# Patient Record
Sex: MALE | Race: WHITE | Employment: UNEMPLOYED | URBAN - METROPOLITAN AREA
[De-identification: names, ages, dates, MRNs, and addresses within clinical notes are randomized per-mention and may not be internally consistent; named-entity substitution may affect disease eponyms.]

---

## 2017-03-14 ENCOUNTER — OFFICE VISIT (OUTPATIENT)
Dept: CARDIOLOGY | Age: 52
End: 2017-03-14

## 2017-03-14 VITALS
RESPIRATION RATE: 14 BRPM | DIASTOLIC BLOOD PRESSURE: 68 MMHG | WEIGHT: 310.8 LBS | BODY MASS INDEX: 47.1 KG/M2 | SYSTOLIC BLOOD PRESSURE: 124 MMHG | HEIGHT: 68 IN | OXYGEN SATURATION: 92 % | HEART RATE: 96 BPM

## 2017-03-14 DIAGNOSIS — R94.31 ABNORMAL EKG: ICD-10-CM

## 2017-03-14 DIAGNOSIS — E78.2 MIXED HYPERLIPIDEMIA: ICD-10-CM

## 2017-03-14 DIAGNOSIS — I25.10 CORONARY ARTERY DISEASE INVOLVING NATIVE CORONARY ARTERY OF NATIVE HEART WITHOUT ANGINA PECTORIS: ICD-10-CM

## 2017-03-14 DIAGNOSIS — I77.9 CAROTID ARTERY DISEASE, UNSPECIFIED LATERALITY (HCC): ICD-10-CM

## 2017-03-14 DIAGNOSIS — E66.01 MORBID OBESITY DUE TO EXCESS CALORIES (HCC): ICD-10-CM

## 2017-03-14 DIAGNOSIS — I73.9 PAD (PERIPHERAL ARTERY DISEASE) (HCC): ICD-10-CM

## 2017-03-14 DIAGNOSIS — Z72.0 TOBACCO ABUSE: ICD-10-CM

## 2017-03-14 DIAGNOSIS — I73.9 CLAUDICATION, CLASS IV (HCC): Primary | ICD-10-CM

## 2017-03-14 PROCEDURE — 93000 ELECTROCARDIOGRAM COMPLETE: CPT | Performed by: INTERNAL MEDICINE

## 2017-03-14 PROCEDURE — 99213 OFFICE O/P EST LOW 20 MIN: CPT | Performed by: INTERNAL MEDICINE

## 2017-03-14 RX ORDER — FUROSEMIDE 20 MG/1
20 TABLET ORAL DAILY
Qty: 30 TABLET | Refills: 6 | Status: SHIPPED | OUTPATIENT
Start: 2017-03-14 | End: 2017-10-10 | Stop reason: SDUPTHER

## 2017-04-20 RX ORDER — CARVEDILOL 3.12 MG/1
TABLET ORAL
Qty: 60 TABLET | Refills: 5 | Status: SHIPPED | OUTPATIENT
Start: 2017-04-20 | End: 2017-11-07 | Stop reason: SDUPTHER

## 2017-04-20 RX ORDER — AMLODIPINE BESYLATE 5 MG/1
TABLET ORAL
Qty: 30 TABLET | Refills: 5 | Status: SHIPPED | OUTPATIENT
Start: 2017-04-20 | End: 2017-11-07 | Stop reason: SDUPTHER

## 2017-04-20 RX ORDER — CLOPIDOGREL BISULFATE 75 MG/1
TABLET ORAL
Qty: 30 TABLET | Refills: 5 | Status: SHIPPED | OUTPATIENT
Start: 2017-04-20 | End: 2017-10-10 | Stop reason: SDUPTHER

## 2017-09-28 RX ORDER — ATORVASTATIN CALCIUM 20 MG/1
TABLET, FILM COATED ORAL
Qty: 90 TABLET | Refills: 0 | Status: SHIPPED | OUTPATIENT
Start: 2017-09-28 | End: 2018-05-01 | Stop reason: SDUPTHER

## 2017-10-10 ENCOUNTER — OFFICE VISIT (OUTPATIENT)
Dept: CARDIOLOGY | Age: 52
End: 2017-10-10

## 2017-10-10 VITALS
WEIGHT: 298 LBS | HEART RATE: 80 BPM | OXYGEN SATURATION: 97 % | BODY MASS INDEX: 45.16 KG/M2 | SYSTOLIC BLOOD PRESSURE: 136 MMHG | DIASTOLIC BLOOD PRESSURE: 80 MMHG | HEIGHT: 68 IN

## 2017-10-10 DIAGNOSIS — Z72.0 TOBACCO ABUSE: ICD-10-CM

## 2017-10-10 DIAGNOSIS — E78.2 MIXED HYPERLIPIDEMIA: ICD-10-CM

## 2017-10-10 DIAGNOSIS — I87.2 VENOUS INSUFFICIENCY: Primary | ICD-10-CM

## 2017-10-10 DIAGNOSIS — I73.9 PAD (PERIPHERAL ARTERY DISEASE) (HCC): ICD-10-CM

## 2017-10-10 DIAGNOSIS — E66.01 MORBID OBESITY DUE TO EXCESS CALORIES (HCC): ICD-10-CM

## 2017-10-10 PROCEDURE — 99213 OFFICE O/P EST LOW 20 MIN: CPT | Performed by: INTERNAL MEDICINE

## 2017-10-10 RX ORDER — CLOPIDOGREL BISULFATE 75 MG/1
TABLET ORAL
Qty: 30 TABLET | Refills: 6 | Status: SHIPPED | OUTPATIENT
Start: 2017-10-10 | End: 2018-05-01 | Stop reason: SDUPTHER

## 2017-10-10 RX ORDER — FUROSEMIDE 40 MG/1
40 TABLET ORAL DAILY
Qty: 30 TABLET | Refills: 6 | Status: SHIPPED | OUTPATIENT
Start: 2017-10-10 | End: 2018-05-01 | Stop reason: SDUPTHER

## 2017-10-10 NOTE — PROGRESS NOTES
Chief Complaint   Patient presents with    Coronary Artery Disease    Carotid Disease       4-30-15: Patient presents for initial medical evaluation. Patient is followed on a regular basis by no one. Referred by Dr. Stephanie Guerra for abn RALEIGH with right RALEIGH of 0.62, left 0.70 at rest. No exercise was performed. Does have right foot pain and left foot discoloration. No LE ulcers present. Symptoms have been ongoing for the past week or so. No hx of LE procedures. Pain occurs with walking less that 200 feet. Patient with chronic back pain with hx of pain pump. No hx of MI, CHF or arrhythmias. No hx of stress test or LHC. Denies any CP but does have SOB and worse with minimal exertion. Smokes [id number] yrs. 6-2-15: as above, s/p abnormal nuclear stress test with reversible ischemia in inferolateral wall/Cx territory, EF of 50%. S/p ECHO with EF of 50%, mild LVH, grade I DD. Compliant with meds. He is SOB with mild exertion, but denies any CP. Continues to have LE discomfort/pain as well as previously stated. Now recalls that he had a TIA a few years back as well. States he had some carotid artery disease at that time but does not know severity. 7-16-15: as above, s/p LHC with 70-80% mid RCA stenosis, s/p FFR=0.82, moderate disease of OM2, normal LVF. S/p LE angiogram with severe left LIEN stenosis s/p stent, 100% right iliac system stenosis s/p unsuccessful antegrade and retrograde  crossing. Left leg is feeling good but continues to have claudication type symptoms in right LE. Compliant with meds. BP is under good control. Cutting down on smoking. S/p carotid US with 16-49% on left ICA and 1-15% on right ICA. Pt denies chest pain, nausea, vomiting, diarrhea, constipation, motor weakness, insomnia, weight loss, syncope, dizziness, lightheadedness, palpitations, PND, orthopnea. 10-15-15: still waiting one having surgery with Dr. Armani Green. Continue to have right LE discomfort/claudication type symptoms.  Left leg is feeling \"great\". Does have small blisters/scabs on right LE. Compliant with meds. No bleeding issues. Pt denies chest pain, dyspnea, dyspnea on exertion, change in exercise capacity, fatigue,  nausea, vomiting, diarrhea, constipation, motor weakness, insomnia, weight loss, syncope, dizziness, lightheadedness, palpitations, PND, orthopnea. Continues to smoke. 9-13-16: s/p  Aorto Bi-fem bypass surgery with DR. Jaun Valderrama of [facility] in 11/2015. Feeling good overall. Legs feeling better. Does wear compression stockings. Continues to smoke but cut down. Pt denies chest pain, dyspnea, dyspnea on exertion, change in exercise capacity, fatigue,  nausea, vomiting, diarrhea, constipation, motor weakness, insomnia, weight loss, syncope, dizziness, lightheadedness, palpitations, PND, orthopnea. Does have right and left lower ext. Wounds following with DR. Rosalind Whitfield and states its clearing up. He is on ASA/PLAVIX and Coumadin. 2-14-17: Pt denies chest pain, dyspnea, dyspnea on exertion, change in exercise capacity, fatigue,  nausea, vomiting, diarrhea, constipation, motor weakness, insomnia, weight loss, syncope, dizziness, lightheadedness, palpitations, PND, orthopnea, or claudication. No nitro use. BP and hr are good. CAD is stable. No LE discoloration or ulcers. Does have some  LE edema. No CHF type symptoms. Lipid profile is normal. Compliant with meds. S/p b/l LE venous dupplex US with evidence of venous insuffiency. S/p normal RALEIGH/PVR as well in 9/2016. Continues to smoke. Lives a sedentary lifestyle. Diet and exercise could be better. Right LE wound healed well. 10-10-17: Pt denies chest pain, dyspnea, dyspnea on exertion, change in exercise capacity, fatigue,  nausea, vomiting, diarrhea, constipation, motor weakness, insomnia, weight loss, syncope, dizziness, lightheadedness, palpitations, PND, orthopnea, or claudication. No nitro use. BP and hr are good. CAD is stable. No LE discoloration or ulcers. No LE edema. fatigue  Psychological ROS: negative  Hematological and Lymphatic ROS: No history of blood clots or bleeding disorder. Respiratory ROS: positive for - shortness of breath  Cardiovascular ROS: positive for - dyspnea on exertion and shortness of breath  Gastrointestinal ROS: no abdominal pain, change in bowel habits, or black or bloody stools  Genito-Urinary ROS: no dysuria, trouble voiding, or hematuria  Musculoskeletal ROS: negative  Neurological ROS: no TIA or stroke symptoms  Dermatological ROS: negative    VITALS:  Blood pressure 136/80, pulse 80, height 5' 8\" (1.727 m), weight 298 lb (135.2 kg), SpO2 97 %. Body mass index is 45.31 kg/m². Physical Examination:  General appearance - alert, well appearing, and in no distress and overweight  Mental status - alert, oriented to person, place, and time  Neck - Neck is supple, no JVD or carotid bruits. No thyromegaly or adenopathy. Chest - clear to auscultation, no wheezes, rales or rhonchi, symmetric air entry  Heart - normal rate, regular rhythm, normal S1, S2, no murmurs, rubs, clicks or gallops  Abdomen - soft, nontender, nondistended, no masses or organomegaly  Neurological - alert, oriented, normal speech, no focal findings or movement disorder noted  Extremities - peripheral pulses abnormal, no pedal edema. Skin - normal coloration and turgor, no rashes, no suspicious skin lesions noted      EKG: normal sinus rhythm, nonspecific ST and T waves changes    Orders Placed This Encounter   Procedures   Mac Tsai MD     No ischemia. ASSESSMENT:    1. Venous insufficiency  Mca Tsai MD   2. Morbid obesity due to excess calories ([address]     3. Tobacco abuse     4. Mixed hyperlipidemia     5. PAD (peripheral artery disease) (HCC)           PLAN:     Patient will need to continue to follow up with you for their general medical care    As always, aggressive risk factor modification is strongly recommended.  We should adhere to the [address] guidelines for HTN management and the NCEP ATP III guidelines for LDL-C management. Cardiac diet is always recommended with low fat, cholesterol, calories and sodium. Continue medications at current doses. ASA/PLAVIX only for now. No clear indication for [address]. Smoking cessation was strongly recommended    Weight loss discussed. PAD and CAD monitoring in future. Consider stress test in near future if warranted by symptoms. Increase lasix to 40mg daily. referall to DR. Xie in future for venous insuff. Patient was advised and encouraged to check blood pressure at home or at a pharmacy, maintain a logbook, and also call us back if blood pressure are above the target ranges or if it is low. Patient clearly understands and agrees to the instructions. We will need to continue to monitor muscle and liver enzymes, BUN, CR, and electrolytes. Details of medical condition explained and patient was warned about adverse consequences of uncontrolled medical conditions and possible side effects of prescribed medications. Patient was advised to go to the ER if he starts experiencing adverse effects of the medications. patient was instructed to call us back or go to nearby emergency room immediately if symptoms get worse or do not improve. Thank you for allowing me to participate in the care of your patient, please don't hesitate to contact me if you have any further questions.

## 2017-11-07 RX ORDER — AMLODIPINE BESYLATE 5 MG/1
TABLET ORAL
Qty: 30 TABLET | Refills: 5 | Status: SHIPPED | OUTPATIENT
Start: 2017-11-07 | End: 2018-05-01 | Stop reason: SDUPTHER

## 2017-11-07 RX ORDER — CARVEDILOL 3.12 MG/1
TABLET ORAL
Qty: 60 TABLET | Refills: 5 | Status: SHIPPED | OUTPATIENT
Start: 2017-11-07 | End: 2018-05-01 | Stop reason: SDUPTHER

## 2017-12-11 RX ORDER — ATORVASTATIN CALCIUM 20 MG/1
20 TABLET, FILM COATED ORAL DAILY
Qty: 90 TABLET | Refills: 3 | Status: SHIPPED | OUTPATIENT
Start: 2017-12-11 | End: 2018-05-01 | Stop reason: SDUPTHER

## 2018-05-01 ENCOUNTER — OFFICE VISIT (OUTPATIENT)
Dept: CARDIOLOGY CLINIC | Age: 53
End: 2018-05-01
Payer: COMMERCIAL

## 2018-05-01 VITALS
DIASTOLIC BLOOD PRESSURE: 70 MMHG | WEIGHT: 315 LBS | BODY MASS INDEX: 47.74 KG/M2 | HEART RATE: 89 BPM | HEIGHT: 68 IN | SYSTOLIC BLOOD PRESSURE: 120 MMHG

## 2018-05-01 DIAGNOSIS — I77.9 CAROTID ARTERY DISEASE, UNSPECIFIED LATERALITY (HCC): ICD-10-CM

## 2018-05-01 DIAGNOSIS — E66.01 MORBID OBESITY DUE TO EXCESS CALORIES (HCC): ICD-10-CM

## 2018-05-01 DIAGNOSIS — Z72.0 TOBACCO ABUSE: ICD-10-CM

## 2018-05-01 DIAGNOSIS — I73.9 PAD (PERIPHERAL ARTERY DISEASE) (HCC): ICD-10-CM

## 2018-05-01 DIAGNOSIS — E78.2 MIXED HYPERLIPIDEMIA: ICD-10-CM

## 2018-05-01 DIAGNOSIS — I25.10 CORONARY ARTERY DISEASE INVOLVING NATIVE CORONARY ARTERY OF NATIVE HEART, ANGINA PRESENCE UNSPECIFIED: Primary | ICD-10-CM

## 2018-05-01 PROCEDURE — 99214 OFFICE O/P EST MOD 30 MIN: CPT | Performed by: INTERNAL MEDICINE

## 2018-05-01 PROCEDURE — 93000 ELECTROCARDIOGRAM COMPLETE: CPT | Performed by: INTERNAL MEDICINE

## 2018-05-01 RX ORDER — CLOPIDOGREL BISULFATE 75 MG/1
TABLET ORAL
Qty: 30 TABLET | Refills: 5 | Status: SHIPPED | OUTPATIENT
Start: 2018-05-01 | End: 2019-01-19 | Stop reason: SDUPTHER

## 2018-05-01 RX ORDER — ATORVASTATIN CALCIUM 20 MG/1
20 TABLET, FILM COATED ORAL DAILY
Qty: 30 TABLET | Refills: 5 | Status: ON HOLD | OUTPATIENT
Start: 2018-05-01 | End: 2019-10-16

## 2018-05-01 RX ORDER — FUROSEMIDE 40 MG/1
40 TABLET ORAL DAILY
Qty: 30 TABLET | Refills: 5 | Status: SHIPPED | OUTPATIENT
Start: 2018-05-01 | End: 2019-01-20 | Stop reason: SDUPTHER

## 2018-05-01 RX ORDER — CARVEDILOL 3.12 MG/1
TABLET ORAL
Qty: 60 TABLET | Refills: 5 | Status: SHIPPED | OUTPATIENT
Start: 2018-05-01 | End: 2019-01-20 | Stop reason: SDUPTHER

## 2018-05-01 RX ORDER — AMLODIPINE BESYLATE 5 MG/1
TABLET ORAL
Qty: 30 TABLET | Refills: 5 | Status: SHIPPED | OUTPATIENT
Start: 2018-05-01 | End: 2019-01-19 | Stop reason: SDUPTHER

## 2018-05-15 RX ORDER — AMLODIPINE BESYLATE 5 MG/1
TABLET ORAL
Qty: 30 TABLET | Refills: 5 | Status: SHIPPED | OUTPATIENT
Start: 2018-05-15 | End: 2018-11-01 | Stop reason: SDUPTHER

## 2018-05-15 RX ORDER — FUROSEMIDE 40 MG/1
40 TABLET ORAL DAILY
Qty: 30 TABLET | Refills: 5 | Status: SHIPPED | OUTPATIENT
Start: 2018-05-15 | End: 2018-11-01 | Stop reason: SDUPTHER

## 2018-05-15 RX ORDER — CLOPIDOGREL BISULFATE 75 MG/1
TABLET ORAL
Qty: 30 TABLET | Refills: 5 | Status: SHIPPED | OUTPATIENT
Start: 2018-05-15 | End: 2018-11-01 | Stop reason: SDUPTHER

## 2018-05-15 RX ORDER — CARVEDILOL 3.12 MG/1
TABLET ORAL
Qty: 60 TABLET | Refills: 5 | Status: SHIPPED | OUTPATIENT
Start: 2018-05-15 | End: 2018-11-01 | Stop reason: SDUPTHER

## 2018-06-14 ENCOUNTER — TELEPHONE (OUTPATIENT)
Dept: CARDIOLOGY CLINIC | Age: 53
End: 2018-06-14

## 2018-06-25 ENCOUNTER — TELEPHONE (OUTPATIENT)
Dept: CARDIOLOGY CLINIC | Age: 53
End: 2018-06-25

## 2018-11-01 ENCOUNTER — OFFICE VISIT (OUTPATIENT)
Dept: CARDIOLOGY CLINIC | Age: 53
End: 2018-11-01
Payer: COMMERCIAL

## 2018-11-01 VITALS
HEIGHT: 68 IN | OXYGEN SATURATION: 93 % | BODY MASS INDEX: 47.01 KG/M2 | WEIGHT: 310.2 LBS | DIASTOLIC BLOOD PRESSURE: 60 MMHG | SYSTOLIC BLOOD PRESSURE: 110 MMHG | HEART RATE: 85 BPM

## 2018-11-01 DIAGNOSIS — E78.2 MIXED HYPERLIPIDEMIA: ICD-10-CM

## 2018-11-01 DIAGNOSIS — E66.01 MORBID OBESITY DUE TO EXCESS CALORIES (HCC): ICD-10-CM

## 2018-11-01 DIAGNOSIS — Z72.0 TOBACCO ABUSE: Primary | ICD-10-CM

## 2018-11-01 DIAGNOSIS — R94.31 ABNORMAL EKG: ICD-10-CM

## 2018-11-01 DIAGNOSIS — I65.29 STENOSIS OF CAROTID ARTERY, UNSPECIFIED LATERALITY: ICD-10-CM

## 2018-11-01 DIAGNOSIS — I73.9 PAD (PERIPHERAL ARTERY DISEASE) (HCC): ICD-10-CM

## 2018-11-01 DIAGNOSIS — I25.10 CORONARY ARTERY DISEASE INVOLVING NATIVE CORONARY ARTERY OF NATIVE HEART WITHOUT ANGINA PECTORIS: ICD-10-CM

## 2018-11-01 DIAGNOSIS — R09.89 LEFT CAROTID BRUIT: ICD-10-CM

## 2018-11-01 PROCEDURE — 99214 OFFICE O/P EST MOD 30 MIN: CPT | Performed by: INTERNAL MEDICINE

## 2018-11-01 NOTE — PROGRESS NOTES
Social History    Marital status:      Spouse name: N/A    Number of children: N/A    Years of education: N/A     Social History Main Topics    Smoking status: Current Every Day Smoker    Smokeless tobacco: Not on file    Alcohol use No    Drug use: Unknown    Sexual activity: Not on file     Other Topics Concern    Not on file     Social History Narrative    No narrative on file       Family History   Problem Relation Age of Onset    Diabetes Mother     High Blood Pressure Father        Current Outpatient Prescriptions   Medication Sig Dispense Refill    atorvastatin (LIPITOR) 20 MG tablet Take 1 tablet by mouth daily 30 tablet 5    amLODIPine (NORVASC) 5 MG tablet TAKE 1 TABLET BY MOUTH DAILY 30 tablet 5    carvedilol (COREG) 3.125 MG tablet TAKE 1 TABLET BY MOUTH TWICE DAILY 60 tablet 5    clopidogrel (PLAVIX) 75 MG tablet TAKE 1 TABLET BY MOUTH DAILY 30 tablet 5    furosemide (LASIX) 40 MG tablet Take 1 tablet by mouth daily 30 tablet 5    aspirin 81 MG tablet Take 81 mg by mouth daily       No current facility-administered medications for this visit. Patient has no known allergies. Review of Systems:  General ROS: positive for  - fatigue  Psychological ROS: negative  Hematological and Lymphatic ROS: No history of blood clots or bleeding disorder. Respiratory ROS: positive for - shortness of breath  Cardiovascular ROS: positive for - dyspnea on exertion and shortness of breath  Gastrointestinal ROS: no abdominal pain, change in bowel habits, or black or bloody stools  Genito-Urinary ROS: no dysuria, trouble voiding, or hematuria  Musculoskeletal ROS: negative  Neurological ROS: no TIA or stroke symptoms  Dermatological ROS: negative    VITALS:  Blood pressure 110/60, pulse 85, height 5' 8\" (1.727 m), weight (!) 310 lb 3.2 oz (140.7 kg), SpO2 93 %. Body mass index is 47.17 kg/m².     Physical Examination:  General appearance - alert, well appearing, and in no distress and

## 2018-11-07 ENCOUNTER — HOSPITAL ENCOUNTER (OUTPATIENT)
Dept: ULTRASOUND IMAGING | Age: 53
Discharge: HOME | End: 2018-11-09
Payer: COMMERCIAL

## 2018-11-07 DIAGNOSIS — R09.89 LEFT CAROTID BRUIT: ICD-10-CM

## 2018-11-07 DIAGNOSIS — I73.9 PAD (PERIPHERAL ARTERY DISEASE) (HCC): ICD-10-CM

## 2018-11-07 PROCEDURE — 93880 EXTRACRANIAL BILAT STUDY: CPT

## 2018-11-07 PROCEDURE — 93924 LWR XTR VASC STDY BILAT: CPT

## 2019-01-22 RX ORDER — FUROSEMIDE 40 MG/1
40 TABLET ORAL DAILY
Qty: 30 TABLET | Refills: 5 | Status: SHIPPED | OUTPATIENT
Start: 2019-01-22 | End: 2021-09-13 | Stop reason: SDUPTHER

## 2019-01-22 RX ORDER — AMLODIPINE BESYLATE 5 MG/1
TABLET ORAL
Qty: 30 TABLET | Refills: 0 | Status: SHIPPED | OUTPATIENT
Start: 2019-01-22 | End: 2020-07-13

## 2019-01-22 RX ORDER — CLOPIDOGREL BISULFATE 75 MG/1
TABLET ORAL
Qty: 30 TABLET | Refills: 0 | Status: SHIPPED | OUTPATIENT
Start: 2019-01-22 | End: 2019-04-11

## 2019-01-22 RX ORDER — CARVEDILOL 3.12 MG/1
TABLET ORAL
Qty: 60 TABLET | Refills: 5 | Status: SHIPPED | OUTPATIENT
Start: 2019-01-22 | End: 2020-01-20

## 2019-02-21 RX ORDER — AMLODIPINE BESYLATE 5 MG/1
TABLET ORAL
Qty: 30 TABLET | Refills: 5 | Status: SHIPPED | OUTPATIENT
Start: 2019-02-21 | End: 2019-04-11 | Stop reason: SDUPTHER

## 2019-02-21 RX ORDER — CLOPIDOGREL BISULFATE 75 MG/1
TABLET ORAL
Qty: 30 TABLET | Refills: 5 | Status: SHIPPED | OUTPATIENT
Start: 2019-02-21 | End: 2019-04-11 | Stop reason: SDUPTHER

## 2019-04-11 ENCOUNTER — OFFICE VISIT (OUTPATIENT)
Dept: CARDIOLOGY CLINIC | Age: 54
End: 2019-04-11
Payer: COMMERCIAL

## 2019-04-11 VITALS
WEIGHT: 298 LBS | SYSTOLIC BLOOD PRESSURE: 110 MMHG | BODY MASS INDEX: 45.16 KG/M2 | DIASTOLIC BLOOD PRESSURE: 70 MMHG | HEART RATE: 88 BPM | HEIGHT: 68 IN

## 2019-04-11 DIAGNOSIS — I65.29 STENOSIS OF CAROTID ARTERY, UNSPECIFIED LATERALITY: ICD-10-CM

## 2019-04-11 DIAGNOSIS — E66.01 MORBID OBESITY DUE TO EXCESS CALORIES (HCC): ICD-10-CM

## 2019-04-11 DIAGNOSIS — I25.10 CORONARY ARTERY DISEASE INVOLVING NATIVE CORONARY ARTERY OF NATIVE HEART, ANGINA PRESENCE UNSPECIFIED: Primary | ICD-10-CM

## 2019-04-11 DIAGNOSIS — Z72.0 TOBACCO ABUSE: ICD-10-CM

## 2019-04-11 DIAGNOSIS — E66.01 MORBID OBESITY (HCC): ICD-10-CM

## 2019-04-11 DIAGNOSIS — I73.9 PAD (PERIPHERAL ARTERY DISEASE) (HCC): ICD-10-CM

## 2019-04-11 DIAGNOSIS — E78.2 MIXED HYPERLIPIDEMIA: ICD-10-CM

## 2019-04-11 PROCEDURE — 99214 OFFICE O/P EST MOD 30 MIN: CPT | Performed by: INTERNAL MEDICINE

## 2019-04-11 PROCEDURE — 93000 ELECTROCARDIOGRAM COMPLETE: CPT | Performed by: INTERNAL MEDICINE

## 2019-04-11 NOTE — PROGRESS NOTES
Chief Complaint   Patient presents with    Coronary Artery Disease    Carotid Disease    Claudication     P.A.D.       4-30-15: Patient presents for initial medical evaluation. Patient is followed on a regular basis by no one. Referred by Dr. Kiersten Cabrera for abn RALEIGH with right RALEIGH of 0.62, left 0.70 at rest. No exercise was performed. Does have right foot pain and left foot discoloration. No LE ulcers present. Symptoms have been ongoing for the past week or so. No hx of LE procedures. Pain occurs with walking less that 200 feet. Patient with chronic back pain with hx of pain pump. No hx of MI, CHF or arrhythmias. No hx of stress test or LHC. Denies any CP but does have SOB and worse with minimal exertion. Smokes [id number] yrs. 6-2-15: as above, s/p abnormal nuclear stress test with reversible ischemia in inferolateral wall/Cx territory, EF of 50%. S/p ECHO with EF of 50%, mild LVH, grade I DD. Compliant with meds. He is SOB with mild exertion, but denies any CP. Continues to have LE discomfort/pain as well as previously stated. Now recalls that he had a TIA a few years back as well. States he had some carotid artery disease at that time but does not know severity. 7-16-15: as above, s/p LHC with 70-80% mid RCA stenosis, s/p FFR=0.82, moderate disease of OM2, normal LVF. S/p LE angiogram with severe left LIEN stenosis s/p stent, 100% right iliac system stenosis s/p unsuccessful antegrade and retrograde  crossing. Left leg is feeling good but continues to have claudication type symptoms in right LE. Compliant with meds. BP is under good control. Cutting down on smoking. S/p carotid US with 16-49% on left ICA and 1-15% on right ICA. Pt denies chest pain, nausea, vomiting, diarrhea, constipation, motor weakness, insomnia, weight loss, syncope, dizziness, lightheadedness, palpitations, PND, orthopnea. 10-15-15: still waiting one having surgery with Dr. Shruthi Robb.  Continue to have right LE discomfort/claudication type symptoms. Left leg is feeling \"great\". Does have small blisters/scabs on right LE. Compliant with meds. No bleeding issues. Pt denies chest pain, dyspnea, dyspnea on exertion, change in exercise capacity, fatigue,  nausea, vomiting, diarrhea, constipation, motor weakness, insomnia, weight loss, syncope, dizziness, lightheadedness, palpitations, PND, orthopnea. Continues to smoke. 9-13-16: s/p  Aorto Bi-fem bypass surgery with DR. Dajuna Heredia of [facility] in 11/2015. Feeling good overall. Legs feeling better. Does wear compression stockings. Continues to smoke but cut down. Pt denies chest pain, dyspnea, dyspnea on exertion, change in exercise capacity, fatigue,  nausea, vomiting, diarrhea, constipation, motor weakness, insomnia, weight loss, syncope, dizziness, lightheadedness, palpitations, PND, orthopnea. Does have right and left lower ext. Wounds following with DR. Lilyan Duverney and states its clearing up. He is on ASA/PLAVIX and Coumadin. 2-14-17: Pt denies chest pain, dyspnea, dyspnea on exertion, change in exercise capacity, fatigue,  nausea, vomiting, diarrhea, constipation, motor weakness, insomnia, weight loss, syncope, dizziness, lightheadedness, palpitations, PND, orthopnea, or claudication. No nitro use. BP and hr are good. CAD is stable. No LE discoloration or ulcers. Does have some  LE edema. No CHF type symptoms. Lipid profile is normal. Compliant with meds. S/p b/l LE venous dupplex US with evidence of venous insuffiency. S/p normal RALEIGH/PVR as well in 9/2016. Continues to smoke. Lives a sedentary lifestyle. Diet and exercise could be better. Right LE wound healed well. 10-10-17: Pt denies chest pain, dyspnea, dyspnea on exertion, change in exercise capacity, fatigue,  nausea, vomiting, diarrhea, constipation, motor weakness, insomnia, weight loss, syncope, dizziness, lightheadedness, palpitations, PND, orthopnea, or claudication. No nitro use. BP and hr are good.  CAD is S/p CUS       Impression   Impression: 1. 50-69% stenosis on the right   2. 50-69% stenosis seen on the left           Patient Active Problem List   Diagnosis    Morbid obesity ([facility]    Tobacco abuse    Hyperlipidemia    Xanthoma    Dyspnea    Abnormal EKG    TIA (transient ischemic attack)    [facility]    CAD (coronary artery disease)    PAD (peripheral artery disease) ([facility]    Morbid obesity due to excess calories ([facility]       Past Surgical History:   Procedure Laterality Date    JOINT REPLACEMENT      KNEE    OTHER SURGICAL HISTORY      PAIN STIMULANT       Social History     Socioeconomic History    Marital status:      Spouse name: Not on file    Number of children: Not on file    Years of education: Not on file    Highest education level: Not on file   Occupational History    Not on file   Social Needs    Financial resource strain: Not on file    Food insecurity:     Worry: Not on file     Inability: Not on file    Transportation needs:     Medical: Not on file     Non-medical: Not on file   Tobacco Use    Smoking status: Current Every Day Smoker   Substance and Sexual Activity    Alcohol use: No    Drug use: Not on file    Sexual activity: Not on file   Lifestyle    Physical activity:     Days per week: Not on file     Minutes per session: Not on file    Stress: Not on file   Relationships    Social connections:     Talks on phone: Not on file     Gets together: Not on file     Attends [employer] service: Not on file     Active member of club or organization: Not on file     Attends meetings of clubs or organizations: Not on file     Relationship status: Not on file    Intimate partner violence:     Fear of current or ex partner: Not on file     Emotionally abused: Not on file     Physically abused: Not on file     Forced sexual activity: Not on file   Other Topics Concern    Not on file   Social History Narrative    Not on file       Family History Problem Relation Age of Onset    Diabetes Mother     High Blood Pressure Father        Current Outpatient Medications   Medication Sig Dispense Refill    clopidogrel (PLAVIX) 75 MG tablet TAKE 1 TABLET BY MOUTH DAILY 30 tablet 0    amLODIPine (NORVASC) 5 MG tablet TAKE 1 TABLET BY MOUTH DAILY 30 tablet 0    furosemide (LASIX) 40 MG tablet TAKE 1 TABLET BY MOUTH DAILY 30 tablet 5    carvedilol (COREG) 3.125 MG tablet TAKE 1 TABLET BY MOUTH TWICE DAILY 60 tablet 5    atorvastatin (LIPITOR) 20 MG tablet Take 1 tablet by mouth daily 30 tablet 5    aspirin 81 MG tablet Take 81 mg by mouth daily       No current facility-administered medications for this visit. Patient has no known allergies. Review of Systems:  General ROS: positive for  - fatigue  Psychological ROS: negative  Hematological and Lymphatic ROS: No history of blood clots or bleeding disorder. Respiratory ROS: positive for - shortness of breath  Cardiovascular ROS: positive for - dyspnea on exertion and shortness of breath  Gastrointestinal ROS: no abdominal pain, change in bowel habits, or black or bloody stools  Genito-Urinary ROS: no dysuria, trouble voiding, or hematuria  Musculoskeletal ROS: negative  Neurological ROS: no TIA or stroke symptoms  Dermatological ROS: negative    VITALS:  Blood pressure 110/70, pulse 88, height 5' 8\" (1.727 m), weight 298 lb (135.2 kg). Body mass index is 45.31 kg/m². Physical Examination:  General appearance - alert, well appearing, and in no distress and overweight  Mental status - alert, oriented to person, place, and time  Neck - Neck is supple, no JVD. + left  carotid bruits. No thyromegaly or adenopathy.    Chest - clear to auscultation, no wheezes, rales or rhonchi, symmetric air entry  Heart - normal rate, regular rhythm, normal S1, S2, no murmurs, rubs, clicks or gallops  Abdomen - soft, nontender, nondistended, no masses or organomegaly  Neurological - alert, oriented, normal speech, no condition explained and patient was warned about adverse consequences of uncontrolled medical conditions and possible side effects of prescribed medications. Patient was advised to go to the ER if he starts experiencing adverse effects of the medications. patient was instructed to call us back or go to nearby emergency room immediately if symptoms get worse or do not improve. Thank you for allowing me to participate in the care of your patient, please don't hesitate to contact me if you have any further questions.

## 2019-04-15 ENCOUNTER — TELEPHONE (OUTPATIENT)
Dept: CARDIOLOGY CLINIC | Age: 54
End: 2019-04-15

## 2019-04-15 NOTE — TELEPHONE ENCOUNTER
PATIENT CALLED TO NOTIFY IS GOING BACK ON THE PLAVIX 75MG    XARELTO 2.5MG IS TOO EXPENSIVE    PATIENT WANTED TO NOTIFY YOU

## 2019-06-24 RX ORDER — ATORVASTATIN CALCIUM 20 MG/1
20 TABLET, FILM COATED ORAL DAILY
Qty: 90 TABLET | Refills: 2 | Status: SHIPPED | OUTPATIENT
Start: 2019-06-24 | End: 2020-05-12

## 2019-10-10 ENCOUNTER — OFFICE VISIT (OUTPATIENT)
Dept: CARDIOLOGY CLINIC | Age: 54
End: 2019-10-10
Payer: MEDICARE

## 2019-10-10 VITALS
BODY MASS INDEX: 45.01 KG/M2 | WEIGHT: 297 LBS | HEIGHT: 68 IN | SYSTOLIC BLOOD PRESSURE: 110 MMHG | HEART RATE: 86 BPM | OXYGEN SATURATION: 96 % | DIASTOLIC BLOOD PRESSURE: 80 MMHG

## 2019-10-10 DIAGNOSIS — E78.2 MIXED HYPERLIPIDEMIA: ICD-10-CM

## 2019-10-10 DIAGNOSIS — Z72.0 TOBACCO ABUSE: Primary | ICD-10-CM

## 2019-10-10 DIAGNOSIS — E66.01 MORBID OBESITY (HCC): ICD-10-CM

## 2019-10-10 DIAGNOSIS — R68.89 ABNORMAL ANKLE BRACHIAL INDEX (ABI): ICD-10-CM

## 2019-10-10 DIAGNOSIS — I73.9 PAD (PERIPHERAL ARTERY DISEASE) (HCC): ICD-10-CM

## 2019-10-10 DIAGNOSIS — E66.01 MORBID OBESITY DUE TO EXCESS CALORIES (HCC): ICD-10-CM

## 2019-10-10 DIAGNOSIS — L97.511 SKIN ULCER OF RIGHT FOOT, LIMITED TO BREAKDOWN OF SKIN (HCC): ICD-10-CM

## 2019-10-10 DIAGNOSIS — I65.23 BILATERAL CAROTID ARTERY STENOSIS: ICD-10-CM

## 2019-10-10 DIAGNOSIS — I25.10 CORONARY ARTERY DISEASE INVOLVING NATIVE CORONARY ARTERY OF NATIVE HEART WITHOUT ANGINA PECTORIS: ICD-10-CM

## 2019-10-10 PROCEDURE — G8598 ASA/ANTIPLAT THER USED: HCPCS | Performed by: INTERNAL MEDICINE

## 2019-10-10 PROCEDURE — 3017F COLORECTAL CA SCREEN DOC REV: CPT | Performed by: INTERNAL MEDICINE

## 2019-10-10 PROCEDURE — G8427 DOCREV CUR MEDS BY ELIG CLIN: HCPCS | Performed by: INTERNAL MEDICINE

## 2019-10-10 PROCEDURE — G8417 CALC BMI ABV UP PARAM F/U: HCPCS | Performed by: INTERNAL MEDICINE

## 2019-10-10 PROCEDURE — 4004F PT TOBACCO SCREEN RCVD TLK: CPT | Performed by: INTERNAL MEDICINE

## 2019-10-10 PROCEDURE — G8484 FLU IMMUNIZE NO ADMIN: HCPCS | Performed by: INTERNAL MEDICINE

## 2019-10-10 PROCEDURE — 99214 OFFICE O/P EST MOD 30 MIN: CPT | Performed by: INTERNAL MEDICINE

## 2019-10-16 ENCOUNTER — HOSPITAL ENCOUNTER (OUTPATIENT)
Dept: CARDIAC CATH/INVASIVE PROCEDURES | Age: 54
Discharge: HOME | End: 2019-10-16
Attending: INTERNAL MEDICINE | Admitting: INTERNAL MEDICINE
Payer: MEDICARE

## 2019-10-16 ENCOUNTER — APPOINTMENT (OUTPATIENT)
Dept: ULTRASOUND IMAGING | Age: 54
End: 2019-10-16
Payer: MEDICARE

## 2019-10-16 VITALS — HEIGHT: 68 IN | BODY MASS INDEX: 45.01 KG/M2 | WEIGHT: 297 LBS

## 2019-10-16 LAB
ABO/RH: NORMAL
ANION GAP SERPL CALCULATED.3IONS-SCNC: 11 MEQ/L (ref 9–15)
ANTIBODY SCREEN: NORMAL
BUN BLDV-MCNC: 20 MG/DL (ref 6–20)
CALCIUM SERPL-MCNC: 8.7 MG/DL (ref 8.5–9.9)
CHLORIDE BLD-SCNC: 104 MEQ/L (ref 95–107)
CO2: 27 MEQ/L (ref 20–31)
CREAT SERPL-MCNC: 0.87 MG/DL (ref 0.7–1.2)
GFR AFRICAN AMERICAN: >60
GFR NON-AFRICAN AMERICAN: >60
GLUCOSE BLD-MCNC: 95 MG/DL (ref 70–99)
HCT VFR BLD CALC: 41.8 % (ref 42–52)
HEMOGLOBIN: 13.6 G/DL (ref 14–18)
MCH RBC QN AUTO: 30.5 PG (ref 27–31.3)
MCHC RBC AUTO-ENTMCNC: 32.6 % (ref 33–37)
MCV RBC AUTO: 93.6 FL (ref 80–100)
PDW BLD-RTO: 15.5 % (ref 11.5–14.5)
PLATELET # BLD: 225 K/UL (ref 130–400)
POTASSIUM SERPL-SCNC: 4.2 MEQ/L (ref 3.4–4.9)
RBC # BLD: 4.47 M/UL (ref 4.7–6.1)
SODIUM BLD-SCNC: 142 MEQ/L (ref 135–144)
WBC # BLD: 9.8 K/UL (ref 4.8–10.8)

## 2019-10-16 PROCEDURE — 75625 CONTRAST EXAM ABDOMINL AORTA: CPT | Performed by: INTERNAL MEDICINE

## 2019-10-16 PROCEDURE — 86900 BLOOD TYPING SEROLOGIC ABO: CPT

## 2019-10-16 PROCEDURE — C1887 CATHETER, GUIDING: HCPCS

## 2019-10-16 PROCEDURE — 36247 INS CATH ABD/L-EXT ART 3RD: CPT | Performed by: INTERNAL MEDICINE

## 2019-10-16 PROCEDURE — 6360000004 HC RX CONTRAST MEDICATION: Performed by: INTERNAL MEDICINE

## 2019-10-16 PROCEDURE — 75716 ARTERY X-RAYS ARMS/LEGS: CPT | Performed by: INTERNAL MEDICINE

## 2019-10-16 PROCEDURE — 6360000002 HC RX W HCPCS

## 2019-10-16 PROCEDURE — C1894 INTRO/SHEATH, NON-LASER: HCPCS

## 2019-10-16 PROCEDURE — 86901 BLOOD TYPING SEROLOGIC RH(D): CPT

## 2019-10-16 PROCEDURE — 85027 COMPLETE CBC AUTOMATED: CPT

## 2019-10-16 PROCEDURE — 36200 PLACE CATHETER IN AORTA: CPT | Performed by: INTERNAL MEDICINE

## 2019-10-16 PROCEDURE — 2500000003 HC RX 250 WO HCPCS

## 2019-10-16 PROCEDURE — 86850 RBC ANTIBODY SCREEN: CPT

## 2019-10-16 PROCEDURE — 2709999900 HC NON-CHARGEABLE SUPPLY

## 2019-10-16 PROCEDURE — C1769 GUIDE WIRE: HCPCS

## 2019-10-16 PROCEDURE — 80048 BASIC METABOLIC PNL TOTAL CA: CPT

## 2019-10-16 PROCEDURE — 2580000003 HC RX 258

## 2019-10-16 RX ORDER — IODIXANOL 320 MG/ML
100 INJECTION, SOLUTION INTRAVASCULAR
Status: COMPLETED | OUTPATIENT
Start: 2019-10-16 | End: 2019-10-16

## 2019-10-16 RX ORDER — HYDRALAZINE HYDROCHLORIDE 20 MG/ML
10 INJECTION INTRAMUSCULAR; INTRAVENOUS EVERY 10 MIN PRN
Status: DISCONTINUED | OUTPATIENT
Start: 2019-10-16 | End: 2019-10-17 | Stop reason: HOSPADM

## 2019-10-16 RX ORDER — SODIUM CHLORIDE 0.9 % (FLUSH) 0.9 %
10 SYRINGE (ML) INJECTION PRN
Status: DISCONTINUED | OUTPATIENT
Start: 2019-10-16 | End: 2019-10-17 | Stop reason: HOSPADM

## 2019-10-16 RX ORDER — SODIUM CHLORIDE 0.9 % (FLUSH) 0.9 %
10 SYRINGE (ML) INJECTION EVERY 12 HOURS SCHEDULED
Status: DISCONTINUED | OUTPATIENT
Start: 2019-10-16 | End: 2019-10-17 | Stop reason: HOSPADM

## 2019-10-16 RX ORDER — ONDANSETRON 2 MG/ML
4 INJECTION INTRAMUSCULAR; INTRAVENOUS EVERY 6 HOURS PRN
Status: DISCONTINUED | OUTPATIENT
Start: 2019-10-16 | End: 2019-10-17 | Stop reason: HOSPADM

## 2019-10-16 RX ORDER — SODIUM CHLORIDE 9 MG/ML
INJECTION, SOLUTION INTRAVENOUS CONTINUOUS
Status: DISCONTINUED | OUTPATIENT
Start: 2019-10-16 | End: 2019-10-16 | Stop reason: HOSPADM

## 2019-10-16 RX ORDER — SODIUM CHLORIDE 9 MG/ML
INJECTION, SOLUTION INTRAVENOUS CONTINUOUS
Status: DISCONTINUED | OUTPATIENT
Start: 2019-10-16 | End: 2019-10-16 | Stop reason: SDUPTHER

## 2019-10-16 RX ORDER — ONDANSETRON 2 MG/ML
4 INJECTION INTRAMUSCULAR; INTRAVENOUS EVERY 6 HOURS PRN
Status: DISCONTINUED | OUTPATIENT
Start: 2019-10-16 | End: 2019-10-16 | Stop reason: SDUPTHER

## 2019-10-16 RX ORDER — ACETAMINOPHEN 325 MG/1
650 TABLET ORAL EVERY 4 HOURS PRN
Status: DISCONTINUED | OUTPATIENT
Start: 2019-10-16 | End: 2019-10-17 | Stop reason: HOSPADM

## 2019-10-16 RX ORDER — LABETALOL 20 MG/4 ML (5 MG/ML) INTRAVENOUS SYRINGE
10 EVERY 30 MIN PRN
Status: DISCONTINUED | OUTPATIENT
Start: 2019-10-16 | End: 2019-10-17 | Stop reason: HOSPADM

## 2019-10-16 RX ADMIN — IODIXANOL 165 ML: 320 INJECTION, SOLUTION INTRAVASCULAR at 14:08

## 2019-10-17 RX ORDER — CLOPIDOGREL BISULFATE 75 MG/1
TABLET ORAL
Qty: 90 TABLET | Refills: 1 | Status: SHIPPED | OUTPATIENT
Start: 2019-10-17 | End: 2020-04-13

## 2019-11-08 ENCOUNTER — HOSPITAL ENCOUNTER (OUTPATIENT)
Dept: ULTRASOUND IMAGING | Age: 54
Discharge: HOME | End: 2019-11-10
Payer: MEDICARE

## 2019-11-08 DIAGNOSIS — I65.23 BILATERAL CAROTID ARTERY STENOSIS: ICD-10-CM

## 2019-11-08 PROCEDURE — 93880 EXTRACRANIAL BILAT STUDY: CPT

## 2020-01-20 RX ORDER — CARVEDILOL 3.12 MG/1
3.12 TABLET ORAL 2 TIMES DAILY
Qty: 60 TABLET | Refills: 6 | Status: SHIPPED | OUTPATIENT
Start: 2020-01-20 | End: 2020-08-10

## 2020-04-13 RX ORDER — CLOPIDOGREL BISULFATE 75 MG/1
TABLET ORAL
Qty: 90 TABLET | Refills: 1 | Status: SHIPPED | OUTPATIENT
Start: 2020-04-13 | End: 2020-09-24

## 2020-04-13 NOTE — TELEPHONE ENCOUNTER
lov 10/10/19  Requested Prescriptions     Pending Prescriptions Disp Refills    clopidogrel (PLAVIX) 75 MG tablet [Pharmacy Med Name: CLOPIDOGREL 75MG TABLETS] 90 tablet 1     Sig: TAKE ONE TABLET BY MOUTH EVERY DAY

## 2020-05-12 RX ORDER — ATORVASTATIN CALCIUM 20 MG/1
20 TABLET, FILM COATED ORAL DAILY
Qty: 90 TABLET | Refills: 0 | Status: SHIPPED | OUTPATIENT
Start: 2020-05-12 | End: 2020-08-10

## 2020-07-13 RX ORDER — AMLODIPINE BESYLATE 5 MG/1
TABLET ORAL
Qty: 30 TABLET | Refills: 1 | Status: SHIPPED | OUTPATIENT
Start: 2020-07-13 | End: 2020-09-09

## 2020-08-10 RX ORDER — ATORVASTATIN CALCIUM 20 MG/1
20 TABLET, FILM COATED ORAL DAILY
Qty: 90 TABLET | Refills: 2 | Status: SHIPPED | OUTPATIENT
Start: 2020-08-10 | End: 2021-07-06

## 2020-08-10 RX ORDER — CARVEDILOL 3.12 MG/1
TABLET ORAL
Qty: 180 TABLET | Refills: 2 | Status: SHIPPED | OUTPATIENT
Start: 2020-08-10 | End: 2021-07-06

## 2020-09-09 RX ORDER — AMLODIPINE BESYLATE 5 MG/1
TABLET ORAL
Qty: 30 TABLET | Refills: 2 | Status: SHIPPED | OUTPATIENT
Start: 2020-09-09 | End: 2020-12-08

## 2020-09-22 ENCOUNTER — TELEPHONE (OUTPATIENT)
Dept: CARDIOLOGY CLINIC | Age: 55
End: 2020-09-22

## 2020-09-22 NOTE — TELEPHONE ENCOUNTER
Received fax from [facility] for clearance. Per dr Maggie Rogers needs appointment not seen since 2019.  Left message on machine to call office to schedule appointment

## 2020-09-24 ENCOUNTER — OFFICE VISIT (OUTPATIENT)
Dept: CARDIOLOGY CLINIC | Age: 55
End: 2020-09-24
Payer: MEDICARE

## 2020-09-24 VITALS
BODY MASS INDEX: 48.35 KG/M2 | SYSTOLIC BLOOD PRESSURE: 126 MMHG | TEMPERATURE: 97.9 F | WEIGHT: 315 LBS | HEART RATE: 85 BPM | DIASTOLIC BLOOD PRESSURE: 86 MMHG

## 2020-09-24 PROCEDURE — G8427 DOCREV CUR MEDS BY ELIG CLIN: HCPCS | Performed by: INTERNAL MEDICINE

## 2020-09-24 PROCEDURE — 4004F PT TOBACCO SCREEN RCVD TLK: CPT | Performed by: INTERNAL MEDICINE

## 2020-09-24 PROCEDURE — 93000 ELECTROCARDIOGRAM COMPLETE: CPT | Performed by: INTERNAL MEDICINE

## 2020-09-24 PROCEDURE — 99214 OFFICE O/P EST MOD 30 MIN: CPT | Performed by: INTERNAL MEDICINE

## 2020-09-24 PROCEDURE — 3017F COLORECTAL CA SCREEN DOC REV: CPT | Performed by: INTERNAL MEDICINE

## 2020-09-24 PROCEDURE — G8417 CALC BMI ABV UP PARAM F/U: HCPCS | Performed by: INTERNAL MEDICINE

## 2020-09-24 RX ORDER — CLOPIDOGREL BISULFATE 75 MG/1
75 TABLET ORAL DAILY
COMMUNITY
End: 2020-10-09 | Stop reason: SDUPTHER

## 2020-09-24 NOTE — LETTER
[facility] [place]  [facility]  Phone: [phone]  Fax: [phone], DO        September 24, 2020    [address] [facility] Cristiane Rangel      To whom it may concern: The above named patient is considered to be Low to Intermediate risk for perioperative cardiovascular events from a cardiac standpoint. He  may proceed with scheduled surgery. If you have any questions or concerns, please don't hesitate to call.     Sincerely,        Clayton Leonardo DO

## 2020-09-24 NOTE — PROGRESS NOTES
Chief Complaint   Patient presents with    Cardiac Clearance     PAIN PUMP REPLACEMENT 9/30/20     Discuss Medications     NEEDS TO STOP THE BLOOD THINNERS       4-30-15: Patient presents for initial medical evaluation. Patient is followed on a regular basis by no one. Referred by Dr. Elizabeth Santiago for abn RALEIGH with right RALEIGH of 0.62, left 0.70 at rest. No exercise was performed. Does have right foot pain and left foot discoloration. No LE ulcers present. Symptoms have been ongoing for the past week or so. No hx of LE procedures. Pain occurs with walking less that 200 feet. Patient with chronic back pain with hx of pain pump. No hx of MI, CHF or arrhythmias. No hx of stress test or LHC. Denies any CP but does have SOB and worse with minimal exertion. Smokes [id number] yrs. 6-2-15: as above, s/p abnormal nuclear stress test with reversible ischemia in inferolateral wall/Cx territory, EF of 50%. S/p ECHO with EF of 50%, mild LVH, grade I DD. Compliant with meds. He is SOB with mild exertion, but denies any CP. Continues to have LE discomfort/pain as well as previously stated. Now recalls that he had a TIA a few years back as well. States he had some carotid artery disease at that time but does not know severity. 7-16-15: as above, s/p LHC with 70-80% mid RCA stenosis, s/p FFR=0.82, moderate disease of OM2, normal LVF. S/p LE angiogram with severe left LIEN stenosis s/p stent, 100% right iliac system stenosis s/p unsuccessful antegrade and retrograde  crossing. Left leg is feeling good but continues to have claudication type symptoms in right LE. Compliant with meds. BP is under good control. Cutting down on smoking. S/p carotid US with 16-49% on left ICA and 1-15% on right ICA. Pt denies chest pain, nausea, vomiting, diarrhea, constipation, motor weakness, insomnia, weight loss, syncope, dizziness, lightheadedness, palpitations, PND, orthopnea. 10-15-15: still waiting one having surgery with Dr. Veronica Duran. Continue to have right LE discomfort/claudication type symptoms. Left leg is feeling \"great\". Does have small blisters/scabs on right LE. Compliant with meds. No bleeding issues. Pt denies chest pain, dyspnea, dyspnea on exertion, change in exercise capacity, fatigue,  nausea, vomiting, diarrhea, constipation, motor weakness, insomnia, weight loss, syncope, dizziness, lightheadedness, palpitations, PND, orthopnea. Continues to smoke. 9-13-16: s/p  Aorto Bi-fem bypass surgery with DR. Nina Milligan of [facility] in 11/2015. Feeling good overall. Legs feeling better. Does wear compression stockings. Continues to smoke but cut down. Pt denies chest pain, dyspnea, dyspnea on exertion, change in exercise capacity, fatigue,  nausea, vomiting, diarrhea, constipation, motor weakness, insomnia, weight loss, syncope, dizziness, lightheadedness, palpitations, PND, orthopnea. Does have right and left lower ext. Wounds following with DR. Nabil Mabry and states its clearing up. He is on ASA/PLAVIX and Coumadin. 2-14-17: Pt denies chest pain, dyspnea, dyspnea on exertion, change in exercise capacity, fatigue,  nausea, vomiting, diarrhea, constipation, motor weakness, insomnia, weight loss, syncope, dizziness, lightheadedness, palpitations, PND, orthopnea, or claudication. No nitro use. BP and hr are good. CAD is stable. No LE discoloration or ulcers. Does have some  LE edema. No CHF type symptoms. Lipid profile is normal. Compliant with meds. S/p b/l LE venous dupplex US with evidence of venous insuffiency. S/p normal RALEIGH/PVR as well in 9/2016. Continues to smoke. Lives a sedentary lifestyle. Diet and exercise could be better. Right LE wound healed well. 10-10-17: Pt denies chest pain, dyspnea, dyspnea on exertion, change in exercise capacity, fatigue,  nausea, vomiting, diarrhea, constipation, motor weakness, insomnia, weight loss, syncope, dizziness, lightheadedness, palpitations, PND, orthopnea, or claudication. No nitro use.  BP and hr are good. CAD is stable. No LE discoloration or ulcers. No LE edema. No CHF type symptoms. Lipid profile is normal from 2015. Was previously on coumadin but somehow has lost a refill for it. LE wounds are healed. Continues to smoke. Does not have SELVIN per patient, did have negative sleep study. 5-1-18: continues to smoke. Pt denies chest pain, dyspnea, dyspnea on exertion, change in exercise capacity, fatigue,  nausea, vomiting, diarrhea, constipation, motor weakness, insomnia, weight loss, syncope, dizziness, lightheadedness, palpitations, PND, orthopnea, or claudication. No nitro use. BP and hr are good. CAD is stable. No LE discoloration or ulcers. No CHF type symptoms. Lipid profile is normal. No recent hospitalization. No change in meds. No improvement of LE edema with lasix. Not able to lose weight. Lives a sedentary lifestyle. 11-1-18: was not able to obtain RALEIGH/PVR yet. Feels ok overall. Pt denies chest pain, dyspnea, dyspnea on exertion, change in exercise capacity, fatigue,  nausea, vomiting, diarrhea, constipation, motor weakness, insomnia, weight loss, syncope, dizziness, lightheadedness, palpitations, PND, orthopnea, claudication. Continues to smoke. No nitro use. BP and hr are good. CAD is stable. No LE discoloration or ulcers. No LE edema. No CHF type symptoms. Lipid profile is normal. No recent hospitalization. No change in meds. Compliant with meds. Diet and exercise could be better. On DAPT, no bleeding issues. 4-11-19: doing ok overall. Not able to lose any weight. Pt denies chest pain, nausea, vomiting, diarrhea, constipation, motor weakness, insomnia, weight loss, syncope, dizziness, lightheadedness, palpitations, PND, orthopnea, or claudication. No nitro use. BP and hr are good. CAD is stable. No LE discoloration or ulcers. No LE edema. No CHF type symptoms. Lipid profile is normal. No recent hospitalization. No change in meds. Remains on DPAT for hx of CAD and PAD. Continues to smoke. S/p CUS       Impression   Impression: 1. 50-69% stenosis on the right   2. 50-69% stenosis seen on the left       10-10-19: following with dr. Brian Cool at [address] [place] [address] wound care center for LE wounds. S/p RALEIGH/PVR at [address] [place] [address] last week, no results available. Patient with abnormal RALEIGH/PVR in 11/18. Pt denies chest pain, dyspnea, dyspnea on exertion, change in exercise capacity, fatigue,  nausea, vomiting, diarrhea, constipation, motor weakness, insomnia, weight loss, syncope, dizziness, lightheadedness, palpitations, PND, orthopnea. Continues to smoke. . BP and hr are good. CAD is stable. No LE discoloration or ulcers. No LE edema. No CHF type symptoms. On dAPT for hx of PAD and CAD.       9-24-20: NEEDS pre op clearance for pain pump insertion. Hx of CAD and PAD. On DAPT, no bleeding issues. Pt denies chest pain, dyspnea, dyspnea on exertion, change in exercise capacity, fatigue,  nausea, vomiting, diarrhea, constipation, motor weakness, insomnia, weight loss, syncope, dizziness, lightheadedness, palpitations, PND, orthopnea, or claudication. No nitro use. BP and hr are good. No LE discoloration or ulcers. No LE edema. No CHF type symptoms. Lipid profile is normal. No recent hospitalization. No change in meds. He continues to smoke. Hx of mod carotid disease. S/p b/l LE angio with patent aorto bifem graftrs, ? Stenosis of left proximal aorto fem bypass, not able to obtain good images. no sig. LE disease from CFA to foot.        Patient Active Problem List   Diagnosis    Morbid obesity ([address]    Tobacco abuse    Hyperlipidemia    Xanthoma    Dyspnea    Abnormal EKG    TIA (transient ischemic attack)    Carotid artery disease ([address]    CAD (coronary artery disease)    PAD (peripheral artery disease) ([address]    Morbid obesity due to excess calories ([address]    Skin ulcer of right foot, [facility] breakdown [facility]       Past Surgical History:   Procedure Laterality Date    JOINT REPLACEMENT KNEE    OTHER SURGICAL HISTORY      PAIN STIMULANT       Social History     Socioeconomic History    Marital status:      Spouse name: Not on file    Number of children: Not on file    Years of education: Not on file    Highest education level: Not on file   Occupational History    Not on file   Social Needs    Financial resource strain: Not on file    Food insecurity     Worry: Not on file     Inability: Not on file    Transportation needs     Medical: Not on file     Non-medical: Not on file   Tobacco Use    Smoking status: Current Every Day Smoker     Packs/day: 1.00     Years: 30.00     Pack years: 30.00    Smokeless tobacco: Never Used   Substance and Sexual Activity    Alcohol use: No    Drug use: Not on file    Sexual activity: Not on file   Lifestyle    Physical activity     Days per week: Not on file     Minutes per session: Not on file    Stress: Not on file   Relationships    Social connections     Talks on phone: Not on file     Gets together: Not on file     Attends [employer] service: Not on file     Active member of club or organization: Not on file     Attends meetings of clubs or organizations: Not on file     Relationship status: Not on file    Intimate partner violence     Fear of current or ex partner: Not on file     Emotionally abused: Not on file     Physically abused: Not on file     Forced sexual activity: Not on file   Other Topics Concern    Not on file   Social History Narrative    Not on file       Family History   Problem Relation Age of Onset    Diabetes Mother     High Blood Pressure Father        Current Outpatient Medications   Medication Sig Dispense Refill    clopidogrel (PLAVIX) 75 MG tablet Take 75 mg by mouth daily      amLODIPine (NORVASC) 5 MG tablet TAKE 1 TABLET BY MOUTH DAILY 30 tablet 2    atorvastatin (LIPITOR) 20 MG tablet TAKE 1 TABLET BY MOUTH DAILY 90 tablet 2    carvedilol (COREG) 3.125 MG tablet TAKE 1 TABLET BY MOUTH TWICE DAILY 180 tablet 2    furosemide (LASIX) 40 MG tablet TAKE 1 TABLET BY MOUTH DAILY 30 tablet 5    aspirin 81 MG tablet Take 81 mg by mouth daily       No current facility-administered medications for this visit. Patient has no known allergies. Review of Systems:  General ROS: positive for  - fatigue  Psychological ROS: negative  Hematological and Lymphatic ROS: No history of blood clots or bleeding disorder. Respiratory ROS: positive for - shortness of breath  Cardiovascular ROS: positive for - dyspnea on exertion and shortness of breath  Gastrointestinal ROS: no abdominal pain, change in bowel habits, or black or bloody stools  Genito-Urinary ROS: no dysuria, trouble voiding, or hematuria  Musculoskeletal ROS: negative  Neurological ROS: no TIA or stroke symptoms  Dermatological ROS: negative    VITALS:  Blood pressure 126/86, pulse 85, temperature 97.9 °F (36.6 °C), temperature source Infrared, weight (!) 318 lb (144.2 kg). Body mass index is 48.35 kg/m². Physical Examination:  General appearance - alert, well appearing, and in no distress and overweight  Mental status - alert, oriented to person, place, and time  Neck - Neck is supple, no JVD. + left  carotid bruits. No thyromegaly or adenopathy. Chest - clear to auscultation, no wheezes, rales or rhonchi, symmetric air entry  Heart - normal rate, regular rhythm, normal S1, S2, no murmurs, rubs, clicks or gallops  Abdomen - soft, nontender, nondistended, no masses or organomegaly  Neurological - alert, oriented, normal speech, no focal findings or movement disorder noted  Extremities - peripheral pulses abnormal, no pedal edema. Skin - normal coloration and turgor, no rashes, no suspicious skin lesions noted      EKG: normal sinus rhythm, nonspecific ST and T waves changes    Orders Placed This Encounter   Procedures    EKG 12 Lead     No ischemia. ASSESSMENT:     Diagnosis Orders   1. Pre-operative clearance  EKG 12 Lead   2.  Coronary artery disease involving native coronary artery of native heart without angina pectoris     3. Stenosis of carotid artery, unspecified laterality     4. Dyspnea on exertion     5. Mixed hyperlipidemia     6. PAD (peripheral artery disease) ([address]     7. Morbid obesity due to excess calories ([address]     8. Tobacco abuse           PLAN:     Patient will need to continue to follow up with you for their general medical care    As always, aggressive risk factor modification is strongly recommended. We should adhere to the [address] guidelines for HTN management and the NCEP ATP III guidelines for LDL-C management. Cardiac diet is always recommended with low fat, cholesterol, calories and sodium. Continue medications at current doses. Patient is a intermediate  risk patient for the proposed procedure/surgery. No active cardiac conditions such as ischemia, CHF or arrhythmias. ASA 81mg and plavix daily. Ok to hold 5-7 days prior to surgery. Smoking cessation was strongly recommended    Weight loss discussed. PAD and CAD monitoring in future. CHECK CUS yearly. Next OV    Consider stress test in near future if warranted by symptoms. referall for venous insuff when patient is willing. Consider CTA of abd in future for left aorto fem bypass assessment in future. Patient was advised and encouraged to check blood pressure at home or at a pharmacy, maintain a logbook, and also call us back if blood pressure are above the target ranges or if it is low. Patient clearly understands and agrees to the instructions. We will need to continue to monitor muscle and liver enzymes, BUN, CR, and electrolytes. Details of medical condition explained and patient was warned about adverse consequences of uncontrolled medical conditions and possible side effects of prescribed medications. Patient was advised to go to the ER if he starts experiencing adverse effects of the medications. patient was

## 2020-10-11 RX ORDER — CLOPIDOGREL BISULFATE 75 MG/1
75 TABLET ORAL DAILY
Qty: 90 TABLET | Refills: 3 | Status: SHIPPED | OUTPATIENT
Start: 2020-10-11 | End: 2021-09-13 | Stop reason: SDUPTHER

## 2020-12-08 RX ORDER — AMLODIPINE BESYLATE 5 MG/1
TABLET ORAL
Qty: 30 TABLET | Refills: 5 | Status: SHIPPED | OUTPATIENT
Start: 2020-12-08 | End: 2021-08-05

## 2020-12-08 NOTE — TELEPHONE ENCOUNTER
requesting medication refill. Please approve or deny this request.    Rx requested:  Requested Prescriptions     Pending Prescriptions Disp Refills    amLODIPine (NORVASC) 5 MG tablet [Pharmacy Med Name: AMLODIPINE BESYLATE 5MG TABLETS] 30 tablet 2     Sig: TAKE 1 TABLET BY MOUTH DAILY         Last Office Visit:   9/24/2020      Next Visit Date:  No future appointments.

## 2021-07-06 DIAGNOSIS — I25.10 CORONARY ARTERY DISEASE INVOLVING NATIVE CORONARY ARTERY OF NATIVE HEART WITHOUT ANGINA PECTORIS: ICD-10-CM

## 2021-07-06 DIAGNOSIS — E78.2 MIXED HYPERLIPIDEMIA: ICD-10-CM

## 2021-07-06 RX ORDER — CARVEDILOL 3.12 MG/1
TABLET ORAL
Qty: 180 TABLET | Refills: 2 | Status: SHIPPED | OUTPATIENT
Start: 2021-07-06 | End: 2021-09-13 | Stop reason: SDUPTHER

## 2021-07-06 RX ORDER — ATORVASTATIN CALCIUM 20 MG/1
20 TABLET, FILM COATED ORAL DAILY
Qty: 90 TABLET | Refills: 0 | Status: SHIPPED | OUTPATIENT
Start: 2021-07-06 | End: 2021-09-13 | Stop reason: SDUPTHER

## 2021-08-05 DIAGNOSIS — I25.10 CORONARY ARTERY DISEASE INVOLVING NATIVE CORONARY ARTERY OF NATIVE HEART WITHOUT ANGINA PECTORIS: ICD-10-CM

## 2021-08-05 RX ORDER — AMLODIPINE BESYLATE 5 MG/1
TABLET ORAL
Qty: 30 TABLET | Refills: 3 | Status: SHIPPED | OUTPATIENT
Start: 2021-08-05 | End: 2021-09-13 | Stop reason: SDUPTHER

## 2021-08-05 NOTE — TELEPHONE ENCOUNTER
requesting medication refill. Please approve or deny this request.    Rx requested:  Requested Prescriptions     Pending Prescriptions Disp Refills    amLODIPine (NORVASC) 5 MG tablet [Pharmacy Med Name: AMLODIPINE BESYLATE 5MG TABLETS] 30 tablet 5     Sig: TAKE 1 TABLET BY MOUTH DAILY         Last Office Visit:   9/24/2020      Next Visit Date:  No future appointments.

## 2021-09-13 DIAGNOSIS — E78.2 MIXED HYPERLIPIDEMIA: ICD-10-CM

## 2021-09-13 DIAGNOSIS — I25.10 CORONARY ARTERY DISEASE INVOLVING NATIVE CORONARY ARTERY OF NATIVE HEART WITHOUT ANGINA PECTORIS: ICD-10-CM

## 2021-09-14 DIAGNOSIS — E78.2 MIXED HYPERLIPIDEMIA: ICD-10-CM

## 2021-09-14 DIAGNOSIS — I25.10 CORONARY ARTERY DISEASE INVOLVING NATIVE CORONARY ARTERY OF NATIVE HEART WITHOUT ANGINA PECTORIS: ICD-10-CM

## 2021-09-14 RX ORDER — CLOPIDOGREL BISULFATE 75 MG/1
75 TABLET ORAL DAILY
Qty: 90 TABLET | Refills: 0 | Status: SHIPPED | OUTPATIENT
Start: 2021-09-14 | End: 2021-12-13

## 2021-09-14 RX ORDER — CARVEDILOL 3.12 MG/1
TABLET ORAL
Qty: 180 TABLET | Refills: 0 | Status: SHIPPED | OUTPATIENT
Start: 2021-09-14 | End: 2021-12-13

## 2021-09-14 RX ORDER — CARVEDILOL 3.12 MG/1
TABLET ORAL
Qty: 180 TABLET | Refills: 0 | Status: SHIPPED | OUTPATIENT
Start: 2021-09-14 | End: 2021-09-14 | Stop reason: SDUPTHER

## 2021-09-14 RX ORDER — ATORVASTATIN CALCIUM 20 MG/1
20 TABLET, FILM COATED ORAL DAILY
Qty: 90 TABLET | Refills: 0 | Status: SHIPPED | OUTPATIENT
Start: 2021-09-14 | End: 2021-09-14 | Stop reason: SDUPTHER

## 2021-09-14 RX ORDER — CLOPIDOGREL BISULFATE 75 MG/1
75 TABLET ORAL DAILY
Qty: 90 TABLET | Refills: 0 | Status: SHIPPED | OUTPATIENT
Start: 2021-09-14 | End: 2021-09-14 | Stop reason: SDUPTHER

## 2021-09-14 RX ORDER — FUROSEMIDE 40 MG/1
40 TABLET ORAL DAILY
Qty: 90 TABLET | Refills: 0 | Status: SHIPPED | OUTPATIENT
Start: 2021-09-14 | End: 2021-12-07 | Stop reason: SDUPTHER

## 2021-09-14 RX ORDER — AMLODIPINE BESYLATE 5 MG/1
TABLET ORAL
Qty: 90 TABLET | Refills: 0 | Status: SHIPPED | OUTPATIENT
Start: 2021-09-14 | End: 2021-12-13

## 2021-09-14 RX ORDER — ATORVASTATIN CALCIUM 20 MG/1
20 TABLET, FILM COATED ORAL DAILY
Qty: 90 TABLET | Refills: 0 | Status: SHIPPED | OUTPATIENT
Start: 2021-09-14 | End: 2021-12-13

## 2021-09-14 RX ORDER — AMLODIPINE BESYLATE 5 MG/1
TABLET ORAL
Qty: 90 TABLET | Refills: 0 | Status: SHIPPED | OUTPATIENT
Start: 2021-09-14 | End: 2021-09-14 | Stop reason: SDUPTHER

## 2021-09-14 RX ORDER — FUROSEMIDE 40 MG/1
40 TABLET ORAL DAILY
Qty: 90 TABLET | Refills: 0 | Status: SHIPPED | OUTPATIENT
Start: 2021-09-14 | End: 2021-09-14 | Stop reason: SDUPTHER

## 2021-12-07 ENCOUNTER — OFFICE VISIT (OUTPATIENT)
Dept: CARDIOLOGY CLINIC | Age: 56
End: 2021-12-07
Payer: MEDICARE

## 2021-12-07 VITALS
SYSTOLIC BLOOD PRESSURE: 134 MMHG | BODY MASS INDEX: 45.61 KG/M2 | WEIGHT: 300 LBS | DIASTOLIC BLOOD PRESSURE: 86 MMHG | HEART RATE: 89 BPM | TEMPERATURE: 97.1 F

## 2021-12-07 DIAGNOSIS — E66.01 MORBID OBESITY (HCC): ICD-10-CM

## 2021-12-07 DIAGNOSIS — Z72.0 TOBACCO ABUSE: ICD-10-CM

## 2021-12-07 DIAGNOSIS — E66.01 MORBID OBESITY DUE TO EXCESS CALORIES (HCC): ICD-10-CM

## 2021-12-07 DIAGNOSIS — I25.10 CORONARY ARTERY DISEASE INVOLVING NATIVE CORONARY ARTERY OF NATIVE HEART WITHOUT ANGINA PECTORIS: Primary | ICD-10-CM

## 2021-12-07 DIAGNOSIS — I73.9 PAD (PERIPHERAL ARTERY DISEASE) (HCC): ICD-10-CM

## 2021-12-07 DIAGNOSIS — I65.23 BILATERAL CAROTID ARTERY STENOSIS: ICD-10-CM

## 2021-12-07 DIAGNOSIS — L97.511 SKIN ULCER OF RIGHT FOOT, LIMITED TO BREAKDOWN OF SKIN (HCC): ICD-10-CM

## 2021-12-07 DIAGNOSIS — E78.2 MIXED HYPERLIPIDEMIA: ICD-10-CM

## 2021-12-07 PROCEDURE — 93000 ELECTROCARDIOGRAM COMPLETE: CPT | Performed by: INTERNAL MEDICINE

## 2021-12-07 PROCEDURE — G8427 DOCREV CUR MEDS BY ELIG CLIN: HCPCS | Performed by: INTERNAL MEDICINE

## 2021-12-07 PROCEDURE — 4004F PT TOBACCO SCREEN RCVD TLK: CPT | Performed by: INTERNAL MEDICINE

## 2021-12-07 PROCEDURE — 99214 OFFICE O/P EST MOD 30 MIN: CPT | Performed by: INTERNAL MEDICINE

## 2021-12-07 PROCEDURE — G8417 CALC BMI ABV UP PARAM F/U: HCPCS | Performed by: INTERNAL MEDICINE

## 2021-12-07 PROCEDURE — G8484 FLU IMMUNIZE NO ADMIN: HCPCS | Performed by: INTERNAL MEDICINE

## 2021-12-07 PROCEDURE — 3017F COLORECTAL CA SCREEN DOC REV: CPT | Performed by: INTERNAL MEDICINE

## 2021-12-07 RX ORDER — FUROSEMIDE 40 MG/1
40 TABLET ORAL DAILY
Qty: 90 TABLET | Refills: 3 | Status: SHIPPED | OUTPATIENT
Start: 2021-12-07 | End: 2022-09-06 | Stop reason: SDUPTHER

## 2021-12-07 NOTE — PROGRESS NOTES
Chief Complaint   Patient presents with    Coronary Artery Disease       4-30-15: Patient presents for initial medical evaluation. Patient is followed on a regular basis by no one. Referred by Dr. Kai Allen for abn RALEIGH with right RALEIGH of 0.62, left 0.70 at rest. No exercise was performed. Does have right foot pain and left foot discoloration. No LE ulcers present. Symptoms have been ongoing for the past week or so. No hx of LE procedures. Pain occurs with walking less that 200 feet. Patient with chronic back pain with hx of pain pump. No hx of MI, CHF or arrhythmias. No hx of stress test or LHC. Denies any CP but does have SOB and worse with minimal exertion. Smokes [id number] yrs. 6-2-15: as above, s/p abnormal nuclear stress test with reversible ischemia in inferolateral wall/Cx territory, EF of 50%. S/p ECHO with EF of 50%, mild LVH, grade I DD. Compliant with meds. He is SOB with mild exertion, but denies any CP. Continues to have LE discomfort/pain as well as previously stated. Now recalls that he had a TIA a few years back as well. States he had some carotid artery disease at that time but does not know severity. 7-16-15: as above, s/p LHC with 70-80% mid RCA stenosis, s/p FFR=0.82, moderate disease of OM2, normal LVF. S/p LE angiogram with severe left LIEN stenosis s/p stent, 100% right iliac system stenosis s/p unsuccessful antegrade and retrograde  crossing. Left leg is feeling good but continues to have claudication type symptoms in right LE. Compliant with meds. BP is under good control. Cutting down on smoking. S/p carotid US with 16-49% on left ICA and 1-15% on right ICA. Pt denies chest pain, nausea, vomiting, diarrhea, constipation, motor weakness, insomnia, weight loss, syncope, dizziness, lightheadedness, palpitations, PND, orthopnea. 10-15-15: still waiting one having surgery with Dr. Winter Siddiqi. Continue to have right LE discomfort/claudication type symptoms. Left leg is feeling \"great\". Does have small blisters/scabs on right LE. Compliant with meds. No bleeding issues. Pt denies chest pain, dyspnea, dyspnea on exertion, change in exercise capacity, fatigue,  nausea, vomiting, diarrhea, constipation, motor weakness, insomnia, weight loss, syncope, dizziness, lightheadedness, palpitations, PND, orthopnea. Continues to smoke. 9-13-16: s/p  Aorto Bi-fem bypass surgery with DR. Romana eVla of [facility] in 11/2015. Feeling good overall. Legs feeling better. Does wear compression stockings. Continues to smoke but cut down. Pt denies chest pain, dyspnea, dyspnea on exertion, change in exercise capacity, fatigue,  nausea, vomiting, diarrhea, constipation, motor weakness, insomnia, weight loss, syncope, dizziness, lightheadedness, palpitations, PND, orthopnea. Does have right and left lower ext. Wounds following with DR. Helena Schultz and states its clearing up. He is on ASA/PLAVIX and Coumadin. 2-14-17: Pt denies chest pain, dyspnea, dyspnea on exertion, change in exercise capacity, fatigue,  nausea, vomiting, diarrhea, constipation, motor weakness, insomnia, weight loss, syncope, dizziness, lightheadedness, palpitations, PND, orthopnea, or claudication. No nitro use. BP and hr are good. CAD is stable. No LE discoloration or ulcers. Does have some  LE edema. No CHF type symptoms. Lipid profile is normal. Compliant with meds. S/p b/l LE venous dupplex US with evidence of venous insuffiency. S/p normal RALEIGH/PVR as well in 9/2016. Continues to smoke. Lives a sedentary lifestyle. Diet and exercise could be better. Right LE wound healed well. 10-10-17: Pt denies chest pain, dyspnea, dyspnea on exertion, change in exercise capacity, fatigue,  nausea, vomiting, diarrhea, constipation, motor weakness, insomnia, weight loss, syncope, dizziness, lightheadedness, palpitations, PND, orthopnea, or claudication. No nitro use. BP and hr are good. CAD is stable. No LE discoloration or ulcers. No LE edema. No CHF type symptoms. Lipid profile is normal from 2015. Was previously on coumadin but somehow has lost a refill for it. LE wounds are healed. Continues to smoke. Does not have SELVIN per patient, did have negative sleep study. 5-1-18: continues to smoke. Pt denies chest pain, dyspnea, dyspnea on exertion, change in exercise capacity, fatigue,  nausea, vomiting, diarrhea, constipation, motor weakness, insomnia, weight loss, syncope, dizziness, lightheadedness, palpitations, PND, orthopnea, or claudication. No nitro use. BP and hr are good. CAD is stable. No LE discoloration or ulcers. No CHF type symptoms. Lipid profile is normal. No recent hospitalization. No change in meds. No improvement of LE edema with lasix. Not able to lose weight. Lives a sedentary lifestyle. 11-1-18: was not able to obtain RALEIGH/PVR yet. Feels ok overall. Pt denies chest pain, dyspnea, dyspnea on exertion, change in exercise capacity, fatigue,  nausea, vomiting, diarrhea, constipation, motor weakness, insomnia, weight loss, syncope, dizziness, lightheadedness, palpitations, PND, orthopnea, claudication. Continues to smoke. No nitro use. BP and hr are good. CAD is stable. No LE discoloration or ulcers. No LE edema. No CHF type symptoms. Lipid profile is normal. No recent hospitalization. No change in meds. Compliant with meds. Diet and exercise could be better. On DAPT, no bleeding issues. 4-11-19: doing ok overall. Not able to lose any weight. Pt denies chest pain, nausea, vomiting, diarrhea, constipation, motor weakness, insomnia, weight loss, syncope, dizziness, lightheadedness, palpitations, PND, orthopnea, or claudication. No nitro use. BP and hr are good. CAD is stable. No LE discoloration or ulcers. No LE edema. No CHF type symptoms. Lipid profile is normal. No recent hospitalization. No change in meds. Remains on DPAT for hx of CAD and PAD. Continues to smoke. S/p CUS       Impression   Impression: 1. 50-69% stenosis on the right   2. 50-69% stenosis seen on the left       10-10-19: following with dr. Judge Parker at [facility] wound care center for LE wounds. S/p RALEIGH/PVR at [facility] last week, no results available. Patient with abnormal RALEIGH/PVR in 11/18. Pt denies chest pain, dyspnea, dyspnea on exertion, change in exercise capacity, fatigue,  nausea, vomiting, diarrhea, constipation, motor weakness, insomnia, weight loss, syncope, dizziness, lightheadedness, palpitations, PND, orthopnea. Continues to smoke. . BP and hr are good. CAD is stable. No LE discoloration or ulcers. No LE edema. No CHF type symptoms. On dAPT for hx of PAD and CAD.       9-24-20: NEEDS pre op clearance for pain pump insertion. Hx of CAD and PAD. On DAPT, no bleeding issues. Pt denies chest pain, dyspnea, dyspnea on exertion, change in exercise capacity, fatigue,  nausea, vomiting, diarrhea, constipation, motor weakness, insomnia, weight loss, syncope, dizziness, lightheadedness, palpitations, PND, orthopnea, or claudication. No nitro use. BP and hr are good. No LE discoloration or ulcers. No LE edema. No CHF type symptoms. Lipid profile is normal. No recent hospitalization. No change in meds. He continues to smoke. Hx of mod carotid disease. S/p b/l LE angio with patent aorto bifem graftrs, ? Stenosis of left proximal aorto fem bypass, not able to obtain good images. no sig. LE disease from CFA to foot. 12-7-21: Hx of CAD and PAD. On DAPT, no bleeding issues. Pt denies chest pain, dyspnea, dyspnea on exertion, change in exercise capacity, fatigue,  nausea, vomiting, diarrhea, constipation, motor weakness, insomnia, weight loss, syncope, dizziness, lightheadedness, palpitations, PND, orthopnea, or claudication. No nitro use. BP and hr are good. No LE discoloration or ulcers. No LE edema. Hx of mod carotid disease. + Smoking. Hx of b/l LE angio with patent aorto bifem graftrs, ? Stenosis of left proximal aorto fem bypass, not able to obtain good images. no sig.  LE disease from CFA to foot. Does have ankle wound on right LE that is healing ok. Patient Active Problem List   Diagnosis    Morbid obesity ([facility]    Tobacco abuse    Hyperlipidemia    Xanthoma    Dyspnea    Abnormal EKG    TIA (transient ischemic attack)    Carotid artery disease ([facility]    CAD (coronary artery disease)    PAD (peripheral artery disease) ([facility]    Morbid obesity due to excess calories ([facility]    Skin ulcer of right foot, limited to breakdown of skin ([facility]       Past Surgical History:   Procedure Laterality Date    JOINT REPLACEMENT      KNEE    OTHER SURGICAL HISTORY      PAIN STIMULANT       Social History     Socioeconomic History    Marital status:      Spouse name: Not on file    Number of children: Not on file    Years of education: Not on file    Highest education level: Not on file   Occupational History    Not on file   Tobacco Use    Smoking status: Current Every Day Smoker     Packs/day: 1.00     Years: 30.00     Pack years: 30.00    Smokeless tobacco: Never Used   Vaping Use    Vaping Use: Never used   Substance and Sexual Activity    Alcohol use: No    Drug use: Not on file    Sexual activity: Not on file   Other Topics Concern    Not on file   Social History Narrative    Not on file     Social Determinants of Health     Financial Resource Strain:     Difficulty of Paying Living Expenses: Not on file   Food Insecurity:     Worried About Running Out of Food in the Last Year: Not on file    [place] of Food in the Last Year: Not on file   Transportation Needs:     Lack of Transportation (Medical): Not on file    Lack of Transportation (Non-Medical):  Not on file   Physical Activity:     Days of Exercise per Week: Not on file    Minutes of Exercise per Session: Not on file   Stress:     Feeling of Stress : Not on file   Social Connections:     Frequency of Communication with Friends and Family: Not on file    Frequency of Social Gatherings with Friends and Family: Not on file    Attends Zoroastrianism Services: Not on file    Active Member of Clubs or Organizations: Not on file    Attends Club or Organization Meetings: Not on file    Marital Status: Not on file   Intimate Partner Violence:     Fear of Current or Ex-Partner: Not on file    Emotionally Abused: Not on file    Physically Abused: Not on file    Sexually Abused: Not on file   Housing Stability:     Unable to Pay for Housing in the Last Year: Not on file    Number of [place] in the Last Year: Not on file    Unstable Housing in the Last Year: Not on file       Family History   Problem Relation Age of Onset    Diabetes Mother     High Blood Pressure Father        Current Outpatient Medications   Medication Sig Dispense Refill    furosemide (LASIX) 40 MG tablet Take 1 tablet by mouth daily 90 tablet 3    amLODIPine (NORVASC) 5 MG tablet TAKE 1 TABLET BY MOUTH DAILY 90 tablet 0    atorvastatin (LIPITOR) 20 MG tablet Take 1 tablet by mouth daily 90 tablet 0    carvedilol (COREG) 3.125 MG tablet TAKE 1 TABLET BY MOUTH TWICE DAILY 180 tablet 0    clopidogrel (PLAVIX) 75 MG tablet Take 1 tablet by mouth daily 90 tablet 0    aspirin 81 MG tablet Take 81 mg by mouth daily       No current facility-administered medications for this visit. Patient has no known allergies. Review of Systems:  General ROS: positive for  - fatigue  Psychological ROS: negative  Hematological and Lymphatic ROS: No history of blood clots or bleeding disorder.    Respiratory ROS: positive for - shortness of breath  Cardiovascular ROS: positive for - dyspnea on exertion and shortness of breath  Gastrointestinal ROS: no abdominal pain, change in bowel habits, or black or bloody stools  Genito-Urinary ROS: no dysuria, trouble voiding, or hematuria  Musculoskeletal ROS: negative  Neurological ROS: no TIA or stroke symptoms  Dermatological ROS: negative    VITALS:  Blood pressure 134/86, pulse 89, temperature 97.1 °F (36.2 °C), temperature source Infrared, weight 300 lb (136.1 kg). Body mass index is 45.61 kg/m². Physical Examination:  General appearance - alert, well appearing, and in no distress and overweight  Mental status - alert, oriented to person, place, and time  Neck - Neck is supple, no JVD. + left  carotid bruits. No thyromegaly or adenopathy. Chest - clear to auscultation, no wheezes, rales or rhonchi, symmetric air entry  Heart - normal rate, regular rhythm, normal S1, S2, no murmurs, rubs, clicks or gallops  Abdomen - soft, nontender, nondistended, no masses or organomegaly  Neurological - alert, oriented, normal speech, no focal findings or movement disorder noted  Extremities - peripheral pulses abnormal, no pedal edema. Skin - normal coloration and turgor, no rashes, no suspicious skin lesions noted      EKG: normal sinus rhythm, nonspecific ST and T waves changes    Orders Placed This Encounter   Procedures    US CAROTID ARTERY BILATERAL    EKG 12 Lead     No ischemia. ASSESSMENT:     Diagnosis Orders   1. Coronary artery disease involving native coronary artery of native heart without angina pectoris  EKG 12 Lead   2. Bilateral carotid artery stenosis  US CAROTID ARTERY BILATERAL   3. Skin ulcer of right foot, limited to breakdown of skin ([address]     4. PAD (peripheral artery disease) ([address]     5. Tobacco abuse     6. Morbid obesity ([address]     7. Morbid obesity due to excess calories ([address]     8. Mixed hyperlipidemia           PLAN:     Patient will need to continue to follow up with you for their general medical care    As always, aggressive risk factor modification is strongly recommended. We should adhere to the [address] guidelines for HTN management and the NCEP ATP III guidelines for LDL-C management. Cardiac diet is always recommended with low fat, cholesterol, calories and sodium. Continue medications at current doses.     Patient is a intermediate  risk patient for the proposed procedure/surgery. No active cardiac conditions such as ischemia, CHF or arrhythmias. ASA 81mg and plavix daily. Ok to hold 5-7 days prior to surgery. Smoking cessation was strongly recommended    Weight loss discussed. PAD and CAD monitoring in future. CHECK CUS     Consider stress test in near future if warranted by symptoms. Refer  for venous insuff when patient is willing. Consider CTA of abd in future for left aorto fem bypass assessment in future. Patient was advised and encouraged to check blood pressure at home or at a pharmacy, maintain a logbook, and also call us back if blood pressure are above the target ranges or if it is low. Patient clearly understands and agrees to the instructions. We will need to continue to monitor muscle and liver enzymes, BUN, CR, and electrolytes. Details of medical condition explained and patient was warned about adverse consequences of uncontrolled medical conditions and possible side effects of prescribed medications. Patient was advised to go to the ER if he starts experiencing adverse effects of the medications. patient was instructed to call us back or go to nearby emergency room immediately if symptoms get worse or do not improve. Thank you for allowing me to participate in the care of your patient, please don't hesitate to contact me if you have any further questions.

## 2021-12-11 DIAGNOSIS — I25.10 CORONARY ARTERY DISEASE INVOLVING NATIVE CORONARY ARTERY OF NATIVE HEART WITHOUT ANGINA PECTORIS: ICD-10-CM

## 2021-12-11 DIAGNOSIS — E78.2 MIXED HYPERLIPIDEMIA: ICD-10-CM

## 2021-12-13 RX ORDER — CARVEDILOL 3.12 MG/1
TABLET ORAL
Qty: 180 TABLET | Refills: 3 | Status: SHIPPED | OUTPATIENT
Start: 2021-12-13

## 2021-12-13 RX ORDER — ATORVASTATIN CALCIUM 20 MG/1
20 TABLET, FILM COATED ORAL DAILY
Qty: 90 TABLET | Refills: 3 | Status: SHIPPED | OUTPATIENT
Start: 2021-12-13 | End: 2022-09-06 | Stop reason: SDUPTHER

## 2021-12-13 RX ORDER — AMLODIPINE BESYLATE 5 MG/1
TABLET ORAL
Qty: 90 TABLET | Refills: 3 | Status: SHIPPED | OUTPATIENT
Start: 2021-12-13 | End: 2022-09-06 | Stop reason: SDUPTHER

## 2021-12-13 RX ORDER — CLOPIDOGREL BISULFATE 75 MG/1
75 TABLET ORAL DAILY
Qty: 90 TABLET | Refills: 3 | Status: SHIPPED | OUTPATIENT
Start: 2021-12-13 | End: 2022-09-06 | Stop reason: SDUPTHER

## 2021-12-13 NOTE — TELEPHONE ENCOUNTER
Requesting medication refill. Please approve or deny this request.    Rx requested:  Requested Prescriptions     Pending Prescriptions Disp Refills    carvedilol (COREG) 3.125 MG tablet [Pharmacy Med Name: CARVEDILOL 3.125MG TABLETS] 180 tablet 0     Sig: TAKE 1 TABLET BY MOUTH TWICE DAILY    amLODIPine (NORVASC) 5 MG tablet [Pharmacy Med Name: AMLODIPINE BESYLATE 5MG TABLETS] 90 tablet 0     Sig: TAKE 1 TABLET BY MOUTH DAILY    atorvastatin (LIPITOR) 20 MG tablet [Pharmacy Med Name: ATORVASTATIN 20MG TABLETS] 90 tablet 0     Sig: TAKE 1 TABLET BY MOUTH DAILY    clopidogrel (PLAVIX) 75 MG tablet [Pharmacy Med Name: CLOPIDOGREL 75MG TABLETS] 90 tablet 0     Sig: TAKE 1 TABLET BY MOUTH DAILY         Last Office Visit:   12/7/2021      Next Visit Date:  Future Appointments   Date Time Provider Mimi Bill   6/21/2022  9:45 AM Young Jonas, DO Kuldip Lynch               Please approve or deny.

## 2022-03-23 ENCOUNTER — HOSPITAL ENCOUNTER (OUTPATIENT)
Dept: ULTRASOUND IMAGING | Age: 57
Discharge: HOME | End: 2022-03-25
Payer: MEDICARE

## 2022-03-23 DIAGNOSIS — I65.23 BILATERAL CAROTID ARTERY STENOSIS: ICD-10-CM

## 2022-03-23 PROCEDURE — 93880 EXTRACRANIAL BILAT STUDY: CPT

## 2022-03-30 PROCEDURE — 93880 EXTRACRANIAL BILAT STUDY: CPT | Performed by: INTERNAL MEDICINE

## 2022-08-09 ENCOUNTER — OFFICE VISIT (OUTPATIENT)
Dept: PAIN MANAGEMENT | Age: 57
End: 2022-08-09
Payer: MEDICARE

## 2022-08-09 VITALS
BODY MASS INDEX: 46.98 KG/M2 | DIASTOLIC BLOOD PRESSURE: 80 MMHG | TEMPERATURE: 97.5 F | SYSTOLIC BLOOD PRESSURE: 122 MMHG | WEIGHT: 310 LBS | HEIGHT: 68 IN | OXYGEN SATURATION: 91 % | HEART RATE: 93 BPM

## 2022-08-09 DIAGNOSIS — M62.830 SPASM OF LUMBAR PARASPINOUS MUSCLE: ICD-10-CM

## 2022-08-09 DIAGNOSIS — M51.06 LUMBAR DISC HERNIATION WITH MYELOPATHY: Primary | ICD-10-CM

## 2022-08-09 PROCEDURE — 99213 OFFICE O/P EST LOW 20 MIN: CPT | Performed by: PAIN MEDICINE

## 2022-08-09 ASSESSMENT — ENCOUNTER SYMPTOMS
RESPIRATORY NEGATIVE: 1
EYES NEGATIVE: 1
GASTROINTESTINAL NEGATIVE: 1
ALLERGIC/IMMUNOLOGIC NEGATIVE: 1

## 2022-08-09 NOTE — PROGRESS NOTES
History of Present Illness     Patient Identification  Konstantin Winn is a 61 y.o. male. Patient information was obtained from patient. Chief Complaint   Chief Complaint   Patient presents with    Back Pain     Lower    Leg Pain     Left     Patient is here today for a Medtronic IT pump refill. His pump is infusing Morphine & Bupivicaine 15mg/ml each, Baclofen 390mcg/ml concentration and daily basal rate of 0.8mg. Baclofen treats the multi-level spasms he has in his mid and low back. Has PTM and says he is using it with benefit. 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 Anthony Nelson is reviewed and consistent. Last UA consistent. Able to do his ADLs. On disability unable to work due to his complex vascular and pain issues. Pain in multi-level spine =8 on pain scale today. Notes some left pain from his buttock to his toes. No adverse or allergic reactions. No signs of fracture, cancer, infection. Was hospitalized at [facility] since last seen due to small bowel obstruction. Was treated mechanically  and doing well with diet now. Next pump refill is 11/1/2022. Has PTM.  Refill in September 16/2022 alarm date is 11/1/2022    Past Medical History:   Diagnosis Date    Abnormal EKG 4/30/2015    CAD (coronary artery disease) 7/16/2015    Carotid artery disease ([address] 6/2/2015    Claudication, class IV ([address] 4/30/2015    Dyspnea 4/30/2015    Hyperlipidemia 4/30/2015    Morbid obesity ([address] 4/30/2015    Osteoarthritis     PAD (peripheral artery disease) ([address] 7/16/2015    Skin ulcer of right foot, limited to breakdown of skin ([address] 10/10/2019    TIA (transient ischemic attack) 6/2/2015    Tobacco abuse 4/30/2015    Xanthoma 4/30/2015     Family History   Problem Relation Age of Onset    Diabetes Mother     High Blood Pressure Father      Current Outpatient Medications   Medication Sig Dispense Refill carvedilol (COREG) 3.125 MG tablet TAKE 1 TABLET BY MOUTH TWICE DAILY 180 tablet 3    amLODIPine (NORVASC) 5 MG tablet TAKE 1 TABLET BY MOUTH DAILY 90 tablet 3    atorvastatin (LIPITOR) 20 MG tablet TAKE 1 TABLET BY MOUTH DAILY 90 tablet 3    clopidogrel (PLAVIX) 75 MG tablet TAKE 1 TABLET BY MOUTH DAILY 90 tablet 3    furosemide (LASIX) 40 MG tablet Take 1 tablet by mouth daily 90 tablet 3    aspirin 81 MG tablet Take 81 mg by mouth daily       No current facility-administered medications for this visit. No Known Allergies    Review of Systems  Review of Systems   Constitutional: Negative. HENT: Negative. Eyes: Negative. Respiratory: Negative. Cardiovascular: Negative. Gastrointestinal: Negative. Endocrine: Negative. Genitourinary: Negative. Musculoskeletal: Negative. Skin: Negative. Allergic/Immunologic: Negative. Neurological: Negative. Hematological: Negative. Psychiatric/Behavioral: Negative. All other systems reviewed and are negative. Physical Exam     /80 (Site: Left Upper Arm, Position: Sitting)   Pulse 93   Temp 97.5 °F (36.4 °C)   Ht 5' 8\" (1.727 m)   Wt (!) 310 lb (140.6 kg)   SpO2 91%   BMI 47.14 kg/m²   Physical Exam  Vitals and nursing note reviewed. Constitutional:       Appearance: Normal appearance. HENT:      Head: Normocephalic. Right Ear: Ear canal normal.      Left Ear: Ear canal normal.      Nose: Nose normal.      Mouth/Throat:      Mouth: Mucous membranes are moist.   Eyes:      Extraocular Movements: Extraocular movements intact. Conjunctiva/sclera: Conjunctivae normal.      Pupils: Pupils are equal, round, and reactive to light. Cardiovascular:      Rate and Rhythm: Normal rate and regular rhythm. Pulses: Normal pulses. Heart sounds: Normal heart sounds. Pulmonary:      Effort: Pulmonary effort is normal.      Breath sounds: Normal breath sounds. Abdominal:      General: Abdomen is flat.  Bowel sounds are normal.      Palpations: Abdomen is soft. Comments: Aorto iliac bypass graft scar Seen. Musculoskeletal:         General: Normal range of motion. Cervical back: Normal range of motion and neck supple. Comments: Bilateral SI Joint tender. Skin:     General: Skin is warm. Capillary Refill: Capillary refill takes less than 2 seconds. Neurological:      General: No focal deficit present. Mental Status: He is alert and oriented to person, place, and time. Mental status is at baseline. Psychiatric:         Mood and Affect: Mood normal.         Behavior: Behavior normal.         Thought Content: Thought content normal.         Judgment: Judgment normal.         Plan       Patient is here today for a Medtronic IT pump refill. His pump is infusing Morphine & Bupivicaine 15mg/ml each, Baclofen 390mcg/ml concentration and daily basal rate of 0.8mg. Baclofen treats the multi-level spasms he has in his mid and low back. Has PTM and says he is using it with benefit. 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 Dimple Patricia is reviewed and consistent. Last UA consistent. Able to do his ADLs. On disability unable to work due to his complex vascular and pain issues. Pain in multi-level spine =8 on pain scale today. Notes some left pain from his buttock to his toes. No adverse or allergic reactions. No signs of fracture, cancer, infection. Was hospitalized at [facility] since last seen due to small bowel obstruction. Was treated mechanically  and doing well with diet now. Next pump refill is 11/1/2022. Has PTM. Refill in September 16/2022 alarm date is 11/1/2022  Plan on Pump refill as needed.

## 2022-08-15 ENCOUNTER — TELEPHONE (OUTPATIENT)
Dept: PAIN MANAGEMENT | Age: 57
End: 2022-08-15

## 2022-08-15 NOTE — TELEPHONE ENCOUNTER
CREATED AND FAXED PUMP REFILL ORDER TO [facility] ([facility] ORDER ID: [id number]    EXPECTED DELIVERY DATE: 09/13/2022       REFILL DATE: 09/16/2022      [facility] ORDER ATTACHED TO THIS ENCOUNTER.

## 2022-09-02 NOTE — TELEPHONE ENCOUNTER
RECEIVED AN E-MAIL FROM [employer] STATING THE CURRENT ORDER HAS . NEW ORDER PLACED. CREATED AND FAXED PUMP REFILL ORDER TO [facility] ([facility] ORDER ID: [id number]     EXPECTED DELIVERY DATE:     22     REFILL DATE:    22     [facility] ORDER ATTACHED TO THIS ENCOUNTER.

## 2022-09-06 ENCOUNTER — OFFICE VISIT (OUTPATIENT)
Dept: CARDIOLOGY CLINIC | Age: 57
End: 2022-09-06
Payer: MEDICARE

## 2022-09-06 VITALS
SYSTOLIC BLOOD PRESSURE: 136 MMHG | WEIGHT: 283 LBS | HEART RATE: 114 BPM | DIASTOLIC BLOOD PRESSURE: 74 MMHG | OXYGEN SATURATION: 95 % | BODY MASS INDEX: 43.03 KG/M2

## 2022-09-06 DIAGNOSIS — E66.01 MORBID OBESITY DUE TO EXCESS CALORIES (HCC): ICD-10-CM

## 2022-09-06 DIAGNOSIS — I73.9 PAD (PERIPHERAL ARTERY DISEASE) (HCC): ICD-10-CM

## 2022-09-06 DIAGNOSIS — I25.10 CORONARY ARTERY DISEASE INVOLVING NATIVE CORONARY ARTERY OF NATIVE HEART WITHOUT ANGINA PECTORIS: Primary | ICD-10-CM

## 2022-09-06 DIAGNOSIS — R06.09 DYSPNEA ON EXERTION: ICD-10-CM

## 2022-09-06 DIAGNOSIS — I65.29 STENOSIS OF CAROTID ARTERY, UNSPECIFIED LATERALITY: ICD-10-CM

## 2022-09-06 DIAGNOSIS — E66.01 MORBID OBESITY (HCC): ICD-10-CM

## 2022-09-06 DIAGNOSIS — E78.2 MIXED HYPERLIPIDEMIA: ICD-10-CM

## 2022-09-06 DIAGNOSIS — Z72.0 TOBACCO ABUSE: ICD-10-CM

## 2022-09-06 PROCEDURE — 99214 OFFICE O/P EST MOD 30 MIN: CPT | Performed by: INTERNAL MEDICINE

## 2022-09-06 PROCEDURE — 93000 ELECTROCARDIOGRAM COMPLETE: CPT | Performed by: INTERNAL MEDICINE

## 2022-09-06 RX ORDER — CETIRIZINE HYDROCHLORIDE 10 MG/1
TABLET ORAL
COMMUNITY
Start: 2022-08-30

## 2022-09-06 RX ORDER — DOXYCYCLINE 100 MG/1
TABLET ORAL
COMMUNITY
Start: 2022-08-30

## 2022-09-06 RX ORDER — FUROSEMIDE 40 MG/1
40 TABLET ORAL DAILY
Qty: 90 TABLET | Refills: 3 | Status: SHIPPED | OUTPATIENT
Start: 2022-09-06

## 2022-09-06 RX ORDER — AMLODIPINE BESYLATE 5 MG/1
5 TABLET ORAL DAILY
Qty: 90 TABLET | Refills: 3 | Status: SHIPPED | OUTPATIENT
Start: 2022-09-06

## 2022-09-06 RX ORDER — CLOPIDOGREL BISULFATE 75 MG/1
75 TABLET ORAL DAILY
Qty: 90 TABLET | Refills: 3 | Status: SHIPPED | OUTPATIENT
Start: 2022-09-06

## 2022-09-06 RX ORDER — CETIRIZINE HYDROCHLORIDE 10 MG/1
10 TABLET ORAL DAILY
COMMUNITY
Start: 2022-08-30 | End: 2022-09-13

## 2022-09-06 RX ORDER — ATORVASTATIN CALCIUM 20 MG/1
20 TABLET, FILM COATED ORAL DAILY
Qty: 90 TABLET | Refills: 3 | Status: SHIPPED | OUTPATIENT
Start: 2022-09-06

## 2022-09-06 RX ORDER — BENZONATATE 200 MG/1
CAPSULE ORAL
COMMUNITY

## 2022-09-06 NOTE — PROGRESS NOTES
Chief Complaint   Patient presents with    Coronary Artery Disease    Carotid Disease    Claudication     P.A.D.    6 Month Follow-Up       4-30-15: Patient presents for initial medical evaluation. Patient is followed on a regular basis by no one. Referred by Dr. Vin Gamez for abn RALEIGH with right RALEIGH of 0.62, left 0.70 at rest. No exercise was performed. Does have right foot pain and left foot discoloration. No LE ulcers present. Symptoms have been ongoing for the past week or so. No hx of LE procedures. Pain occurs with walking less that 200 feet. Patient with chronic back pain with hx of pain pump. No hx of MI, CHF or arrhythmias. No hx of stress test or LHC. Denies any CP but does have SOB and worse with minimal exertion. Smokes [id number] yrs. 6-2-15: as above, s/p abnormal nuclear stress test with reversible ischemia in inferolateral wall/Cx territory, EF of 50%. S/p ECHO with EF of 50%, mild LVH, grade I DD. Compliant with meds. He is SOB with mild exertion, but denies any CP. Continues to have LE discomfort/pain as well as previously stated. Now recalls that he had a TIA a few years back as well. States he had some carotid artery disease at that time but does not know severity. 7-16-15: as above, s/p LHC with 70-80% mid RCA stenosis, s/p FFR=0.82, moderate disease of OM2, normal LVF. S/p LE angiogram with severe left LIEN stenosis s/p stent, 100% right iliac system stenosis s/p unsuccessful antegrade and retrograde  crossing. Left leg is feeling good but continues to have claudication type symptoms in right LE. Compliant with meds. BP is under good control. Cutting down on smoking. S/p carotid US with 16-49% on left ICA and 1-15% on right ICA. Pt denies chest pain, nausea, vomiting, diarrhea, constipation, motor weakness, insomnia, weight loss, syncope, dizziness, lightheadedness, palpitations, PND, orthopnea. 10-15-15: still waiting one having surgery with Dr. Kenny Spaulding.  Continue to have right LE discomfort/claudication type symptoms. Left leg is feeling \"great\". Does have small blisters/scabs on right LE. Compliant with meds. No bleeding issues. Pt denies chest pain, dyspnea, dyspnea on exertion, change in exercise capacity, fatigue,  nausea, vomiting, diarrhea, constipation, motor weakness, insomnia, weight loss, syncope, dizziness, lightheadedness, palpitations, PND, orthopnea. Continues to smoke. 9-13-16: s/p  Aorto Bi-fem bypass surgery with DR. Chay Olmos of [facility] in 11/2015. Feeling good overall. Legs feeling better. Does wear compression stockings. Continues to smoke but cut down. Pt denies chest pain, dyspnea, dyspnea on exertion, change in exercise capacity, fatigue,  nausea, vomiting, diarrhea, constipation, motor weakness, insomnia, weight loss, syncope, dizziness, lightheadedness, palpitations, PND, orthopnea. Does have right and left lower ext. Wounds following with DR. Erma Swan and states its clearing up. He is on ASA/PLAVIX and Coumadin. 2-14-17: Pt denies chest pain, dyspnea, dyspnea on exertion, change in exercise capacity, fatigue,  nausea, vomiting, diarrhea, constipation, motor weakness, insomnia, weight loss, syncope, dizziness, lightheadedness, palpitations, PND, orthopnea, or claudication. No nitro use. BP and hr are good. CAD is stable. No LE discoloration or ulcers. Does have some  LE edema. No CHF type symptoms. Lipid profile is normal. Compliant with meds. S/p b/l LE venous dupplex US with evidence of venous insuffiency. S/p normal RALEIGH/PVR as well in 9/2016. Continues to smoke. Lives a sedentary lifestyle. Diet and exercise could be better. Right LE wound healed well. 10-10-17: Pt denies chest pain, dyspnea, dyspnea on exertion, change in exercise capacity, fatigue,  nausea, vomiting, diarrhea, constipation, motor weakness, insomnia, weight loss, syncope, dizziness, lightheadedness, palpitations, PND, orthopnea, or claudication. No nitro use. BP and hr are good.  CAD is stable. No LE discoloration or ulcers. No LE edema. No CHF type symptoms. Lipid profile is normal from 2015. Was previously on coumadin but somehow has lost a refill for it. LE wounds are healed. Continues to smoke. Does not have SELVIN per patient, did have negative sleep study. 5-1-18: continues to smoke. Pt denies chest pain, dyspnea, dyspnea on exertion, change in exercise capacity, fatigue,  nausea, vomiting, diarrhea, constipation, motor weakness, insomnia, weight loss, syncope, dizziness, lightheadedness, palpitations, PND, orthopnea, or claudication. No nitro use. BP and hr are good. CAD is stable. No LE discoloration or ulcers. No CHF type symptoms. Lipid profile is normal. No recent hospitalization. No change in meds. No improvement of LE edema with lasix. Not able to lose weight. Lives a sedentary lifestyle. 11-1-18: was not able to obtain RALEIGH/PVR yet. Feels ok overall. Pt denies chest pain, dyspnea, dyspnea on exertion, change in exercise capacity, fatigue,  nausea, vomiting, diarrhea, constipation, motor weakness, insomnia, weight loss, syncope, dizziness, lightheadedness, palpitations, PND, orthopnea, claudication. Continues to smoke. No nitro use. BP and hr are good. CAD is stable. No LE discoloration or ulcers. No LE edema. No CHF type symptoms. Lipid profile is normal. No recent hospitalization. No change in meds. Compliant with meds. Diet and exercise could be better. On DAPT, no bleeding issues. 4-11-19: doing ok overall. Not able to lose any weight. Pt denies chest pain, nausea, vomiting, diarrhea, constipation, motor weakness, insomnia, weight loss, syncope, dizziness, lightheadedness, palpitations, PND, orthopnea, or claudication. No nitro use. BP and hr are good. CAD is stable. No LE discoloration or ulcers. No LE edema. No CHF type symptoms. Lipid profile is normal. No recent hospitalization. No change in meds. Remains on DPAT for hx of CAD and PAD. Continues to smoke. S/p CUS       Impression   Impression: 1. 50-69% stenosis on the right   2. 50-69% stenosis seen on the left       10-10-19: following with dr. Barbie Bernabe at [facility] wound care center for LE wounds. S/p RALEIGH/PVR at [facility] last week, no results available. Patient with abnormal RALEIGH/PVR in 11/18. Pt denies chest pain, dyspnea, dyspnea on exertion, change in exercise capacity, fatigue,  nausea, vomiting, diarrhea, constipation, motor weakness, insomnia, weight loss, syncope, dizziness, lightheadedness, palpitations, PND, orthopnea. Continues to smoke. . BP and hr are good. CAD is stable. No LE discoloration or ulcers. No LE edema. No CHF type symptoms. On dAPT for hx of PAD and CAD.       9-24-20: NEEDS pre op clearance for pain pump insertion. Hx of CAD and PAD. On DAPT, no bleeding issues. Pt denies chest pain, dyspnea, dyspnea on exertion, change in exercise capacity, fatigue,  nausea, vomiting, diarrhea, constipation, motor weakness, insomnia, weight loss, syncope, dizziness, lightheadedness, palpitations, PND, orthopnea, or claudication. No nitro use. BP and hr are good. No LE discoloration or ulcers. No LE edema. No CHF type symptoms. Lipid profile is normal. No recent hospitalization. No change in meds. He continues to smoke. Hx of mod carotid disease. S/p b/l LE angio with patent aorto bifem graftrs, ? Stenosis of left proximal aorto fem bypass, not able to obtain good images. no sig. LE disease from CFA to foot. 12-7-21: Hx of CAD and PAD. On DAPT, no bleeding issues. Pt denies chest pain, dyspnea, dyspnea on exertion, change in exercise capacity, fatigue,  nausea, vomiting, diarrhea, constipation, motor weakness, insomnia, weight loss, syncope, dizziness, lightheadedness, palpitations, PND, orthopnea, or claudication. No nitro use. BP and hr are good. No LE discoloration or ulcers. No LE edema. Hx of mod carotid disease. + Smoking. Hx of b/l LE angio with patent aorto bifem graftrs, ?  Stenosis of left proximal aorto fem bypass, not able to obtain good images. no sig. LE disease from CFA to foot. Does have ankle wound on right LE that is healing ok. 9/6/2022: Status post carotid ultrasound in March 2022 with 50 to 69% on the left and less than 50% on the left ICA. Patient with history of CAD and PAD. Patient continues to smoke. He is on dual antiplatelet therapy. No bleeding issues. Hx of b/l LE angio with patent aorto bifem graftrs, ? Stenosis of left proximal aorto fem bypass, not able to obtain good images. no sig. LE disease from CFA to foot. Patient does have chronic venous insufficiency changes of his lower extremities and has bilateral superficial ulcerations.   Not following up with wound care for    Patient Active Problem List   Diagnosis    Morbid obesity ([address]    Tobacco abuse    Hyperlipidemia    Xanthoma    Dyspnea    Abnormal EKG    TIA (transient ischemic attack)    Carotid artery disease (HCC)    CAD (coronary artery disease)    PAD (peripheral artery disease) ([address]    Morbid obesity due to excess calories ([address]    Skin ulcer of right foot, limited to breakdown of skin ([address]       Past Surgical History:   Procedure Laterality Date    JOINT REPLACEMENT      KNEE    OTHER SURGICAL HISTORY      PAIN STIMULANT       Social History     Socioeconomic History    Marital status:    Tobacco Use    Smoking status: Every Day     Packs/day: 1.00     Years: 30.00     Pack years: 30.00     Types: Cigarettes    Smokeless tobacco: Never   Vaping Use    Vaping Use: Never used   Substance and Sexual Activity    Alcohol use: No       Family History   Problem Relation Age of Onset    High Blood Pressure Mother     Coronary Art Dis Mother     Arthritis Mother     Diabetes Mother     Heart Disease Mother     Stroke Father     Coronary Art Dis Father     Heart Disease Father     Arthritis Father     High Blood Pressure Father        Current Outpatient Medications   Medication Sig Dispense Refill cetirizine (ZYRTEC) 10 MG tablet TAKE 1 TABLET BY MOUTH ONCE DAILY FOR 14 DAYS. benzonatate (TESSALON) 200 MG capsule benzonatate 200 mg capsule      doxycycline monohydrate (ADOXA) 100 MG tablet TAKE 1 TABLET BY MOUTH TWICE A DAY FOR 10 DAYS      cetirizine (ZYRTEC) 10 MG tablet Take 10 mg by mouth daily      amLODIPine (NORVASC) 5 MG tablet Take 1 tablet by mouth daily 90 tablet 3    atorvastatin (LIPITOR) 20 MG tablet Take 1 tablet by mouth daily 90 tablet 3    clopidogrel (PLAVIX) 75 MG tablet Take 1 tablet by mouth daily 90 tablet 3    furosemide (LASIX) 40 MG tablet Take 1 tablet by mouth daily 90 tablet 3    carvedilol (COREG) 3.125 MG tablet TAKE 1 TABLET BY MOUTH TWICE DAILY 180 tablet 3    aspirin 81 MG tablet Take 81 mg by mouth daily       No current facility-administered medications for this visit. Patient has no known allergies. Review of Systems:  General ROS: positive for  - fatigue  Psychological ROS: negative  Hematological and Lymphatic ROS: No history of blood clots or bleeding disorder. Respiratory ROS: positive for - shortness of breath  Cardiovascular ROS: positive for - dyspnea on exertion and shortness of breath  Gastrointestinal ROS: no abdominal pain, change in bowel habits, or black or bloody stools  Genito-Urinary ROS: no dysuria, trouble voiding, or hematuria  Musculoskeletal ROS: negative  Neurological ROS: no TIA or stroke symptoms  Dermatological ROS: negative    VITALS:  Blood pressure 136/74, pulse (!) 114, weight 283 lb (128.4 kg), SpO2 95 %. Body mass index is 43.03 kg/m². Physical Examination:  General appearance - alert, well appearing, and in no distress and overweight  Mental status - alert, oriented to person, place, and time  Neck - Neck is supple, no JVD. + left  carotid bruits. No thyromegaly or adenopathy.    Chest - clear to auscultation, no wheezes, rales or rhonchi, symmetric air entry  Heart - normal rate, regular rhythm, normal S1, S2, no murmurs, rubs, clicks or gallops  Abdomen - soft, nontender, nondistended, no masses or organomegaly  Neurological - alert, oriented, normal speech, no focal findings or movement disorder noted  Extremities - peripheral pulses abnormal, no pedal edema. Skin - normal coloration and turgor, no rashes, no suspicious skin lesions noted      EKG: Sinus tachycardia, nonspecific ST and T waves changes    Orders Placed This Encounter   Procedures    EKG 12 Lead     No ischemia. ASSESSMENT:     Diagnosis Orders   1. Coronary artery disease involving native coronary artery of native heart without angina pectoris  EKG 12 Lead    amLODIPine (NORVASC) 5 MG tablet      2. Mixed hyperlipidemia  atorvastatin (LIPITOR) 20 MG tablet      3. Stenosis of carotid artery, unspecified laterality        4. Dyspnea on exertion        5. Morbid obesity ([address]        6. Morbid obesity due to excess calories ([address]        7. PAD (peripheral artery disease) ([address]        8. Tobacco abuse              PLAN:     Patient will need to continue to follow up with you for their general medical care    As always, aggressive risk factor modification is strongly recommended. We should adhere to the [address] guidelines for HTN management and the NCEP ATP III guidelines for LDL-C management. Cardiac diet is always recommended with low fat, cholesterol, calories and sodium. Continue medications at current doses. Check EKG for    ASA 81mg and plavix daily. Smoking cessation was strongly recommended    Weight loss discussed. PAD and CAD monitoring in future. CHECK CUS in March 2023    Consider stress test in near future if warranted by symptoms. Refer for venous insuff when patient is willing. Consider CTA of abd in future for left aorto fem bypass assessment in future.      Patient was advised and encouraged to check blood pressure at home or at a pharmacy, maintain a logbook, and also call us back if blood pressure are above the target ranges or if it is low. Patient clearly understands and agrees to the instructions. We will need to continue to monitor muscle and liver enzymes, BUN, CR, and electrolytes. Details of medical condition explained and patient was warned about adverse consequences of uncontrolled medical conditions and possible side effects of prescribed medications. Patient was advised to go to the ER if he starts experiencing adverse effects of the medications. patient was instructed to call us back or go to nearby emergency room immediately if symptoms get worse or do not improve. Thank you for allowing me to participate in the care of your patient, please don't hesitate to contact me if you have any further questions.

## 2022-09-16 ENCOUNTER — PROCEDURE VISIT (OUTPATIENT)
Dept: PAIN MANAGEMENT | Age: 57
End: 2022-09-16
Payer: MEDICARE

## 2022-09-16 DIAGNOSIS — M62.830 SPASM OF LUMBAR PARASPINOUS MUSCLE: ICD-10-CM

## 2022-09-16 DIAGNOSIS — M51.06 LUMBAR DISC HERNIATION WITH MYELOPATHY: Primary | ICD-10-CM

## 2022-09-16 PROCEDURE — 62370 ANL SP INF PMP W/MDREPRG&FIL: CPT | Performed by: PAIN MEDICINE

## 2022-09-16 NOTE — PROGRESS NOTES
History of Present Illness     Patient Identification  Lalitha Levine is a 61 y.o. male. Patient information was obtained from patient. Chief Complaint   Chief Complaint   Patient presents with    Back Pain     Lower    Leg Pain     Left     Patient is here today for a [employer] pump refill. His pump is infusing Morphine & Bupivicaine 15mg/ml each, Baclofen 390mcg/ml concentration and daily basal rate of 0.8mg. Baclofen treats the multi-level spasms he has in his mid and low back. Has PTM and says he is using it with benefit. 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 Gilford Keys is reviewed and consistent. Last UA consistent. Able to do his ADLs. On disability unable to work due to his complex vascular and pain issues. Pain in multi-level spine =8 on pain scale today. Notes some left pain from his buttock to his toes. No adverse or allergic reactions. No signs of fracture, cancer, infection. Was hospitalized at [facility] since last seen due to small bowel obstruction. Was treated mechanically  and doing well with diet now. Next pump refill is 11/1/2022. Has PTM.      Past Medical History:   Diagnosis Date    Abnormal EKG 4/30/2015    CAD (coronary artery disease) 7/16/2015    Carotid artery disease ([address] 6/2/2015    Claudication, class IV ([address] 4/30/2015    Dyspnea 4/30/2015    Hyperlipidemia 4/30/2015    Morbid obesity ([address] 4/30/2015    Osteoarthritis     PAD (peripheral artery disease) ([address] 7/16/2015    Skin ulcer of right foot, limited to breakdown of skin ([address] 10/10/2019    TIA (transient ischemic attack) 6/2/2015    Tobacco abuse 4/30/2015    Xanthoma 4/30/2015     Family History   Problem Relation Age of Onset    Diabetes Mother     High Blood Pressure Father      Current Outpatient Medications   Medication Sig Dispense Refill    carvedilol (COREG) 3.125 MG tablet TAKE 1 TABLET BY MOUTH TWICE DAILY 180 tablet 3    amLODIPine (NORVASC) 5 MG tablet TAKE 1 TABLET BY MOUTH DAILY 90 tablet 3    atorvastatin (LIPITOR) 20 MG tablet TAKE 1 TABLET BY MOUTH DAILY 90 tablet 3    clopidogrel (PLAVIX) 75 MG tablet TAKE 1 TABLET BY MOUTH DAILY 90 tablet 3    furosemide (LASIX) 40 MG tablet Take 1 tablet by mouth daily 90 tablet 3    aspirin 81 MG tablet Take 81 mg by mouth daily       No current facility-administered medications for this visit. No Known Allergies    Review of Systems  Review of Systems   Constitutional: Negative. HENT: Negative. Eyes: Negative. Respiratory: Negative. Cardiovascular: Negative. Gastrointestinal: Negative. Endocrine: Negative. Genitourinary: Negative. Musculoskeletal: Negative. Skin: Negative. Allergic/Immunologic: Negative. Neurological: Negative. Hematological: Negative. Psychiatric/Behavioral: Negative. All other systems reviewed and are negative. Physical Exam     /80 (Site: Left Upper Arm, Position: Sitting)   Pulse 93   Temp 97.5 °F (36.4 °C)   Ht 5' 8\" (1.727 m)   Wt (!) 310 lb (140.6 kg)   SpO2 91%   BMI 47.14 kg/m²   Physical Exam  Vitals and nursing note reviewed. Constitutional:       Appearance: Normal appearance. HENT:      Head: Normocephalic. Right Ear: Ear canal normal.      Left Ear: Ear canal normal.      Nose: Nose normal.      Mouth/Throat:      Mouth: Mucous membranes are moist.   Eyes:      Extraocular Movements: Extraocular movements intact. Conjunctiva/sclera: Conjunctivae normal.      Pupils: Pupils are equal, round, and reactive to light. Cardiovascular:      Rate and Rhythm: Normal rate and regular rhythm. Pulses: Normal pulses. Heart sounds: Normal heart sounds. Pulmonary:      Effort: Pulmonary effort is normal.      Breath sounds: Normal breath sounds. Abdominal:      General: Abdomen is flat. Bowel sounds are normal.      Palpations: Abdomen is soft. Comments: Aorto iliac bypass graft scar Seen. Musculoskeletal:         General: Normal range of motion. Cervical back: Normal range of motion and neck supple. Comments: Bilateral SI Joint tender. Skin:     General: Skin is warm. Capillary Refill: Capillary refill takes less than 2 seconds. Neurological:      General: No focal deficit present. Mental Status: He is alert and oriented to person, place, and time. Mental status is at baseline. Psychiatric:         Mood and Affect: Mood normal.         Behavior: Behavior normal.         Thought Content: Thought content normal.         Judgment: Judgment normal.         Plan       Patient is here today for a [employer] pump refill. His pump is infusing Morphine & Bupivicaine 15mg/ml each, Baclofen 390mcg/ml concentration and daily basal rate of 0.8mg. Baclofen treats the multi-level spasms he has in his mid and low back. Has PTM and says he is using it with benefit. 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 Elkin Morton is reviewed and consistent. Last UA consistent. Able to do his ADLs. On disability unable to work due to his complex vascular and pain issues. Pain in multi-level spine =8 on pain scale today. Notes some left pain from his buttock to his toes. No adverse or allergic reactions. No signs of fracture, cancer, infection. Was hospitalized at [facility] since last seen due to small bowel obstruction. Was treated mechanically  and doing well with diet now. Pump Refill:. Patent identified with Name and . Pump accessed with , Volume reprogrammed to 20ccs.  Skin site for needle placement identified with maynor, Skin prepped with Betadine, Draped, Port accessed with 25G Needle, 8ccs of fluid withdrawn, 20 ccs of Morphine 15mg per cc, Bupivacaine 15mg per cc, Baclofen 290 mics per cc instilled, Refill alarm date is 1/29/23 will plan refill in 3 months.

## 2022-12-05 ENCOUNTER — TELEPHONE (OUTPATIENT)
Dept: CARDIOLOGY CLINIC | Age: 57
End: 2022-12-05

## 2022-12-05 NOTE — TELEPHONE ENCOUNTER
Selma Hamman from [employer] because pt has been missing dosages of Atorvastatin 20 MG up to 5 times a month.         [phone]

## 2022-12-06 ENCOUNTER — TELEPHONE (OUTPATIENT)
Dept: PAIN MANAGEMENT | Age: 57
End: 2022-12-06

## 2022-12-06 NOTE — TELEPHONE ENCOUNTER
CREATED AND FAXED PUMP REFILL ORDER TO [facility] ([facility]). ORDER ID: [id number]     EXPECTED DELIVERY DATE:  12/14/2022     REFILL DATE: 12/16/2022     HOME CONNECT: NO    MEDICATION ORDERED/CONCENTRATION/UNIT    PF Morphine Sulfate 15 mg/mL  PF Baclofen 290 mCg/mL  PF Bupivacaine HCl 15 mg/mL    TOTAL VOLUME 20 (twenty) mL     [facility] ORDER ATTACHED TO THIS ENCOUNTER.

## 2022-12-16 ENCOUNTER — PROCEDURE VISIT (OUTPATIENT)
Dept: PAIN MANAGEMENT | Age: 57
End: 2022-12-16

## 2022-12-16 VITALS — BODY MASS INDEX: 42.89 KG/M2 | HEIGHT: 68 IN | WEIGHT: 283 LBS

## 2022-12-16 DIAGNOSIS — M51.06 LUMBAR DISC HERNIATION WITH MYELOPATHY: ICD-10-CM

## 2022-12-16 DIAGNOSIS — M62.830 SPASM OF LUMBAR PARASPINOUS MUSCLE: Primary | ICD-10-CM

## 2022-12-16 NOTE — PROGRESS NOTES
History of Present Illness     Patient Identification  Jen Oliver is a 61 y.o. male. Patient information was obtained from patient. Chief Complaint   Chief Complaint   Patient presents with    Back Pain     Lower    Leg Pain     Left     Patient is here today for a [employer] IT pump refill. His pump is infusing Morphine & Bupivicaine 15mg/ml each, Baclofen 390mcg/ml concentration and daily basal rate of 0.8mg. Baclofen treats the multi-level spasms he has in his mid and low back. Has PTM and says he is using it with benefit. 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 Rody Conway is reviewed and consistent. Last UA consistent. Able to do his ADLs. On disability unable to work due to his complex vascular and pain issues. Pain in multi-level spine =8 on pain scale today. Notes some left pain from his buttock to his toes. No adverse or allergic reactions. No signs of fracture, cancer, infection. Was hospitalized at [facility] since last seen due to small bowel obstruction. Was treated mechanically  and doing well with diet now.       Past Medical History:   Diagnosis Date    Abnormal EKG 4/30/2015    CAD (coronary artery disease) 7/16/2015    Carotid artery disease ([address] 6/2/2015    Claudication, class IV ([address] 4/30/2015    Dyspnea 4/30/2015    Hyperlipidemia 4/30/2015    Morbid obesity ([address] 4/30/2015    Osteoarthritis     PAD (peripheral artery disease) ([address] 7/16/2015    Skin ulcer of right foot, limited to breakdown of skin ([address] 10/10/2019    TIA (transient ischemic attack) 6/2/2015    Tobacco abuse 4/30/2015    Xanthoma 4/30/2015     Family History   Problem Relation Age of Onset    Diabetes Mother     High Blood Pressure Father      Current Outpatient Medications   Medication Sig Dispense Refill    carvedilol (COREG) 3.125 MG tablet TAKE 1 TABLET BY MOUTH TWICE DAILY 180 tablet 3 amLODIPine (NORVASC) 5 MG tablet TAKE 1 TABLET BY MOUTH DAILY 90 tablet 3    atorvastatin (LIPITOR) 20 MG tablet TAKE 1 TABLET BY MOUTH DAILY 90 tablet 3    clopidogrel (PLAVIX) 75 MG tablet TAKE 1 TABLET BY MOUTH DAILY 90 tablet 3    furosemide (LASIX) 40 MG tablet Take 1 tablet by mouth daily 90 tablet 3    aspirin 81 MG tablet Take 81 mg by mouth daily       No current facility-administered medications for this visit. No Known Allergies    Review of Systems  Review of Systems   Constitutional: Negative. HENT: Negative. Eyes: Negative. Respiratory: Negative. Cardiovascular: Negative. Gastrointestinal: Negative. Endocrine: Negative. Genitourinary: Negative. Musculoskeletal: Negative. Skin: Negative. Allergic/Immunologic: Negative. Neurological: Negative. Hematological: Negative. Psychiatric/Behavioral: Negative. All other systems reviewed and are negative. Physical Exam     /80 (Site: Left Upper Arm, Position: Sitting)   Pulse 93   Temp 97.5 °F (36.4 °C)   Ht 5' 8\" (1.727 m)   Wt (!) 310 lb (140.6 kg)   SpO2 91%   BMI 47.14 kg/m²   Physical Exam  Vitals and nursing note reviewed. Constitutional:       Appearance: Normal appearance. HENT:      Head: Normocephalic. Right Ear: Ear canal normal.      Left Ear: Ear canal normal.      Nose: Nose normal.      Mouth/Throat:      Mouth: Mucous membranes are moist.   Eyes:      Extraocular Movements: Extraocular movements intact. Conjunctiva/sclera: Conjunctivae normal.      Pupils: Pupils are equal, round, and reactive to light. Cardiovascular:      Rate and Rhythm: Normal rate and regular rhythm. Pulses: Normal pulses. Heart sounds: Normal heart sounds. Pulmonary:      Effort: Pulmonary effort is normal.      Breath sounds: Normal breath sounds. Abdominal:      General: Abdomen is flat. Bowel sounds are normal.      Palpations: Abdomen is soft. Comments:  Aorto iliac bypass graft scar Seen. Musculoskeletal:         General: Normal range of motion. Cervical back: Normal range of motion and neck supple. Comments: Bilateral SI Joint tender. Skin:     General: Skin is warm. Capillary Refill: Capillary refill takes less than 2 seconds. Neurological:      General: No focal deficit present. Mental Status: He is alert and oriented to person, place, and time. Mental status is at baseline. Psychiatric:         Mood and Affect: Mood normal.         Behavior: Behavior normal.         Thought Content: Thought content normal.         Judgment: Judgment normal.         Plan       Patient is here today for a [employer] IT pump refill. His pump is infusing Morphine & Bupivicaine 15mg/ml each, Baclofen 390mcg/ml concentration and daily basal rate of 0.8mg. Baclofen treats the multi-level spasms he has in his mid and low back. Has PTM and says he is using it with benefit. 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 Cortney Broussard is reviewed and consistent. Last UA consistent. Able to do his ADLs. On disability unable to work due to his complex vascular and pain issues. Pain in multi-level spine =8 on pain scale today. Notes some left pain from his buttock to his toes. No adverse or allergic reactions. No signs of fracture, cancer, infection. Was hospitalized at [facility] since last seen due to small bowel obstruction. Was treated mechanically  and doing well with diet now. Pump Refill:. Patent identified with Name and . Pump accessed with , Volume reprogrammed to 20ccs.  Skin site for needle placement identified with maynor, Skin prepped with Betadine, Draped, Port accessed with 25G Needle, 8ccs of fluid withdrawn, 20 ccs of Morphine 15mg per cc, Bupivacaine 15mg per cc, Baclofen 290 mics per cc instilled, Refill alarm date is 23 will plan

## 2023-02-28 ENCOUNTER — TELEPHONE (OUTPATIENT)
Dept: PAIN MANAGEMENT | Age: 58
End: 2023-02-28

## 2023-02-28 NOTE — TELEPHONE ENCOUNTER
CREATED AND FAXED PUMP REFILL ORDER TO [facility] ([facility] ORDER ID: [id number]     EXPECTED DELIVERY DATE:  03/17/2023     REFILL DATE: 03/20/2023     HOME CONNECT: NO    MEDICATION ORDERED/CONCENTRATION/UNIT    PF Morphine Sulfate 15 mg/mL  PF Baclofen 290 mCg/mL  PF Bupivacaine HCl 15 mg/mL    TOTAL VOLUME 20 (twenty) mL     [facility] ORDER ATTACHED TO THIS ENCOUNTER.

## 2023-03-17 NOTE — TELEPHONE ENCOUNTER
RECEIVED MEDICATION FOR PUMP REFILL. MEDICATION IS IN THE POST-OP ROOM IN THE LEFT CABINET.      MEDICATION USE BY DATE: 04/09/2023

## 2023-03-20 ENCOUNTER — PROCEDURE VISIT (OUTPATIENT)
Dept: PAIN MANAGEMENT | Age: 58
End: 2023-03-20

## 2023-03-20 DIAGNOSIS — M62.830 SPASM OF LUMBAR PARASPINOUS MUSCLE: Primary | ICD-10-CM

## 2023-03-20 DIAGNOSIS — M51.06 LUMBAR DISC HERNIATION WITH MYELOPATHY: ICD-10-CM

## 2023-03-20 NOTE — PROGRESS NOTES
graft scar Seen. Musculoskeletal:         General: Normal range of motion. Cervical back: Normal range of motion and neck supple. Comments: Bilateral SI Joint tender. Skin:     General: Skin is warm. Capillary Refill: Capillary refill takes less than 2 seconds. Neurological:      General: No focal deficit present. Mental Status: He is alert and oriented to person, place, and time. Mental status is at baseline. Psychiatric:         Mood and Affect: Mood normal.         Behavior: Behavior normal.         Thought Content: Thought content normal.         Judgment: Judgment normal.         Plan       Patient is here today for a [employer] IT pump refill. His pump is infusing Morphine & Bupivicaine 15mg/ml each, Baclofen 390mcg/ml concentration and daily basal rate of 0.8mg. Baclofen treats the multi-level spasms he has in his mid and low back. Has PTM and says he is using it with benefit. 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 Eve Flynn is reviewed and consistent. Last UA consistent. Able to do his ADLs. On disability unable to work due to his complex vascular and pain issues. Pain in multi-level spine =8 on pain scale today. Notes some left pain from his buttock to his toes. No adverse or allergic reactions. No signs of fracture, cancer, infection. Was hospitalized at [facility] since last seen due to small bowel obstruction. Was treated mechanically  and doing well with diet now. Pump Refill:. Patent identified with Name and . Pump accessed with , Volume reprogrammed to 20ccs.  Skin site for needle placement identified with maynor, Skin prepped with Betadine, Draped, Port accessed with 25G Needle, 8ccs of fluid withdrawn, 20 ccs of Morphine 15mg per cc, Bupivacaine 15mg per cc, Baclofen 290 mics per cc instilled, Refill alarm date is 23 will plan

## 2023-06-13 ENCOUNTER — TELEPHONE (OUTPATIENT)
Dept: PAIN MANAGEMENT | Age: 58
End: 2023-06-13

## 2023-06-19 NOTE — TELEPHONE ENCOUNTER
RECEIVED MEDICATION FOR PUMP REFILL. MEDICATION IS IN THE POST-OP ROOM IN THE LEFT CABINET.      MEDICATION USE BY DATE: 07/14/2023

## 2023-06-20 ENCOUNTER — PROCEDURE VISIT (OUTPATIENT)
Dept: PAIN MANAGEMENT | Age: 58
End: 2023-06-20

## 2023-06-20 DIAGNOSIS — M51.06 LUMBAR DISC HERNIATION WITH MYELOPATHY: Primary | ICD-10-CM

## 2023-06-20 DIAGNOSIS — M62.830 SPASM OF LUMBAR PARASPINOUS MUSCLE: ICD-10-CM

## 2023-06-20 NOTE — PROGRESS NOTES
graft scar Seen. Musculoskeletal:         General: Normal range of motion. Cervical back: Normal range of motion and neck supple. Comments: Bilateral SI Joint tender. Skin:     General: Skin is warm. Capillary Refill: Capillary refill takes less than 2 seconds. Neurological:      General: No focal deficit present. Mental Status: He is alert and oriented to person, place, and time. Mental status is at baseline. Psychiatric:         Mood and Affect: Mood normal.         Behavior: Behavior normal.         Thought Content: Thought content normal.         Judgment: Judgment normal.         Plan       Patient is here today for a [employer] IT pump refill. His pump is infusing Morphine & Bupivicaine 15mg/ml each, Baclofen 390mcg/ml concentration and daily basal rate of 0.8mg. Baclofen treats the multi-level spasms he has in his mid and low back. Has PTM and says he is using it with benefit. 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 [place]paola Laughter is reviewed and consistent. Last UA consistent. Able to do his ADLs. On disability unable to work due to his complex vascular and pain issues. Pain in multi-level spine =8 on pain scale today. Notes some left pain from his buttock to his toes. No adverse or allergic reactions. No signs of fracture, cancer, infection. Was hospitalized at [facility] since last seen due to small bowel obstruction. Was treated mechanically  and doing well with diet now. Pump Refill:. Patent identified with Name and . Pump accessed with , Volume reprogrammed to 20ccs.  Skin site for needle placement identified with maynor, Skin prepped with Betadine, Draped, Port accessed with 25G Needle, 8ccs of fluid withdrawn, 20 ccs of Morphine 15mg per cc, Bupivacaine 15mg per cc, Baclofen 290 mics per cc instilled, Refill alarm date is 120 days will plan

## 2023-06-22 ENCOUNTER — TELEPHONE (OUTPATIENT)
Dept: PAIN MANAGEMENT | Age: 58
End: 2023-06-22

## 2023-06-26 ENCOUNTER — TELEPHONE (OUTPATIENT)
Dept: PAIN MANAGEMENT | Age: 58
End: 2023-06-26

## 2023-06-27 ENCOUNTER — OFFICE VISIT (OUTPATIENT)
Dept: CARDIOLOGY CLINIC | Age: 58
End: 2023-06-27
Payer: MEDICARE

## 2023-06-27 VITALS
HEART RATE: 97 BPM | OXYGEN SATURATION: 98 % | HEIGHT: 67 IN | DIASTOLIC BLOOD PRESSURE: 70 MMHG | BODY MASS INDEX: 46.46 KG/M2 | WEIGHT: 296 LBS | SYSTOLIC BLOOD PRESSURE: 110 MMHG

## 2023-06-27 DIAGNOSIS — G45.9 TIA (TRANSIENT ISCHEMIC ATTACK): ICD-10-CM

## 2023-06-27 DIAGNOSIS — E66.01 MORBID OBESITY DUE TO EXCESS CALORIES (HCC): ICD-10-CM

## 2023-06-27 DIAGNOSIS — I65.29 STENOSIS OF CAROTID ARTERY, UNSPECIFIED LATERALITY: ICD-10-CM

## 2023-06-27 DIAGNOSIS — I73.9 PAD (PERIPHERAL ARTERY DISEASE) (HCC): ICD-10-CM

## 2023-06-27 DIAGNOSIS — Z72.0 TOBACCO ABUSE: ICD-10-CM

## 2023-06-27 DIAGNOSIS — E78.2 MIXED HYPERLIPIDEMIA: ICD-10-CM

## 2023-06-27 DIAGNOSIS — I25.10 CORONARY ARTERY DISEASE INVOLVING NATIVE CORONARY ARTERY OF NATIVE HEART WITHOUT ANGINA PECTORIS: Primary | ICD-10-CM

## 2023-06-27 PROCEDURE — 93000 ELECTROCARDIOGRAM COMPLETE: CPT | Performed by: INTERNAL MEDICINE

## 2023-06-27 PROCEDURE — 99214 OFFICE O/P EST MOD 30 MIN: CPT | Performed by: INTERNAL MEDICINE

## 2023-06-27 RX ORDER — CARVEDILOL 3.12 MG/1
3.12 TABLET ORAL 2 TIMES DAILY
Qty: 180 TABLET | Refills: 3 | Status: SHIPPED | OUTPATIENT
Start: 2023-06-27

## 2023-09-05 DIAGNOSIS — E78.2 MIXED HYPERLIPIDEMIA: ICD-10-CM

## 2023-09-05 DIAGNOSIS — I25.10 CORONARY ARTERY DISEASE INVOLVING NATIVE CORONARY ARTERY OF NATIVE HEART WITHOUT ANGINA PECTORIS: ICD-10-CM

## 2023-09-05 RX ORDER — CLOPIDOGREL BISULFATE 75 MG/1
TABLET ORAL
Qty: 90 TABLET | Refills: 3 | Status: SHIPPED | OUTPATIENT
Start: 2023-09-05

## 2023-09-05 RX ORDER — FUROSEMIDE 40 MG/1
TABLET ORAL
Qty: 90 TABLET | Refills: 3 | Status: SHIPPED | OUTPATIENT
Start: 2023-09-05

## 2023-09-05 RX ORDER — AMLODIPINE BESYLATE 5 MG/1
TABLET ORAL
Qty: 90 TABLET | Refills: 3 | Status: SHIPPED | OUTPATIENT
Start: 2023-09-05

## 2023-09-05 RX ORDER — ATORVASTATIN CALCIUM 20 MG/1
TABLET, FILM COATED ORAL
Qty: 90 TABLET | Refills: 3 | Status: SHIPPED | OUTPATIENT
Start: 2023-09-05

## 2023-09-05 NOTE — TELEPHONE ENCOUNTER
Requesting medication refill. Rx requested:  Requested Prescriptions     Pending Prescriptions Disp Refills    clopidogrel (PLAVIX) 75 MG tablet [Pharmacy Med Name: CLOPIDOGREL 75 MG TABLET] 90 tablet 3     Sig: TAKE 1 TABLET BY MOUTH EVERY DAY    furosemide (LASIX) 40 MG tablet [Pharmacy Med Name: FUROSEMIDE 40 MG TABLET] 90 tablet 3     Sig: TAKE 1 TABLET BY MOUTH EVERY DAY    atorvastatin (LIPITOR) 20 MG tablet [Pharmacy Med Name: ATORVASTATIN 20 MG TABLET] 90 tablet 3     Sig: TAKE 1 TABLET BY MOUTH EVERY DAY    amLODIPine (NORVASC) 5 MG tablet [Pharmacy Med Name: AMLODIPINE BESYLATE 5 MG TAB] 90 tablet 3     Sig: TAKE 1 TABLET BY MOUTH EVERY DAY         Last Office Visit:   6/27/2023      Next Visit Date:  Future Appointments   Date Time Provider [address] [place] [address]   9/20/2023  9:00 AM MD SHIRAZ [facility] [place]   6/18/2024 10:30 AM Young Carreno DO [address] [facility]               Last refill 09/06/2022.

## 2023-09-15 ENCOUNTER — TELEPHONE (OUTPATIENT)
Dept: PAIN MANAGEMENT | Age: 58
End: 2023-09-15

## 2023-09-15 NOTE — TELEPHONE ENCOUNTER
CREATED AND FAXED PUMP REFILL ORDER TO [facility] ([facility] ORDER ID: [id number]     EXPECTED DELIVERY DATE:  09/19/2023     REFILL DATE: 09/20/2023     HOME CONNECT: NO    MEDICATION ORDERED/CONCENTRATION/UNIT    PF Morphine Sulfate 15 mg/mL  PF Baclofen 290 mCg/mL  PF Bupivacaine HCl 15 mg/mL    TOTAL VOLUME 20 (twenty) mL     [facility] ORDER ATTACHED TO THIS ENCOUNTER.

## 2023-09-19 NOTE — TELEPHONE ENCOUNTER
RECEIVED MEDICATION FOR PUMP REFILL. MEDICATION IS IN THE POST-OP ROOM IN THE LEFT CABINET.      MEDICATION USE BY DATE: 10/15/2023

## 2023-09-20 ENCOUNTER — PROCEDURE VISIT (OUTPATIENT)
Dept: PAIN MANAGEMENT | Age: 58
End: 2023-09-20
Payer: MEDICARE

## 2023-09-20 VITALS
DIASTOLIC BLOOD PRESSURE: 76 MMHG | BODY MASS INDEX: 45.47 KG/M2 | TEMPERATURE: 98.1 F | WEIGHT: 300 LBS | HEIGHT: 68 IN | SYSTOLIC BLOOD PRESSURE: 138 MMHG

## 2023-09-20 DIAGNOSIS — M51.06 LUMBAR DISC HERNIATION WITH MYELOPATHY: Primary | ICD-10-CM

## 2023-09-20 PROCEDURE — 62370 ANL SP INF PMP W/MDREPRG&FIL: CPT | Performed by: PAIN MEDICINE

## 2023-09-20 NOTE — PROGRESS NOTES
graft scar Seen. Musculoskeletal:         General: Normal range of motion. Cervical back: Normal range of motion and neck supple. Comments: Bilateral SI Joint tender. Skin:     General: Skin is warm. Capillary Refill: Capillary refill takes less than 2 seconds. Neurological:      General: No focal deficit present. Mental Status: He is alert and oriented to person, place, and time. Mental status is at baseline. Psychiatric:         Mood and Affect: Mood normal.         Behavior: Behavior normal.         Thought Content: Thought content normal.         Judgment: Judgment normal.         Plan       Patient is here today for a [employer] IT pump refill. His pump is infusing Morphine & Bupivicaine 15mg/ml each, Baclofen 390mcg/ml concentration and daily basal rate of 0.8mg. Baclofen treats the multi-level spasms he has in his mid and low back. Has PTM and says he is using it with benefit. 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 Rob Rule is reviewed and consistent. Last UA consistent. Able to do his ADLs. On disability unable to work due to his complex vascular and pain issues. Pain in multi-level spine =8 on pain scale today. Notes some left pain from his buttock to his toes. No adverse or allergic reactions. No signs of fracture, cancer, infection. Was hospitalized at [facility] since last seen due to small bowel obstruction. Was treated mechanically  and doing well with diet now. Pump Refill:. Patent identified with Name and . Pump accessed with , Volume reprogrammed to 20ccs.  Skin site for needle placement identified with maynor, Skin prepped with Betadine, Draped, Port accessed with 25G Needle, 8ccs of fluid withdrawn, 20 ccs of Morphine 15mg per cc, Bupivacaine 15mg per cc, Baclofen 290 mics per cc instilled, Refill alarm date is 24 will plan

## 2023-11-20 ENCOUNTER — TELEPHONE (OUTPATIENT)
Dept: PAIN MANAGEMENT | Age: 58
End: 2023-11-20

## 2023-11-20 NOTE — TELEPHONE ENCOUNTER
RECEIVED INCOMING FAX REQUESTING MEDICAL RECORDS FROM 2022 ALONG WITH MEDICAL NECESSITY FORM FILLED OUT BY DR Anabelle Ambrose. FORMS FILLED OUT AND SIGNED BY JOHANNY WERE FAXED ALONG WITH 2022 OV NOTES TO [facility] [phone].  ( 36 PAGES TOTAL)

## 2023-12-04 ENCOUNTER — TELEPHONE (OUTPATIENT)
Dept: PAIN MANAGEMENT | Age: 58
End: 2023-12-04

## 2023-12-04 NOTE — TELEPHONE ENCOUNTER
CREATED AND FAXED PUMP REFILL ORDER TO [facility] ORDER ID: [id number]      EXPECTED DELIVERY DATE:  12/08/2023     REFILL DATE: 12/12/2023     HOME CONNECT: NO    MEDICATION ORDERED/CONCENTRATION/UNIT    PF Morphine Sulfate 15 mg/mL  PF Baclofen 290 mCg/mL  PF Bupivacaine HCl 15 mg/mL    TOTAL VOLUME 20 (twenty) mL     [facility] ORDER ATTACHED TO THIS ENCOUNTER.

## 2023-12-11 NOTE — TELEPHONE ENCOUNTER
RECEIVED MEDICATION FOR PUMP REFILL. MEDICATION IS IN THE POST-OP ROOM IN THE LEFT CABINET.      MEDICATION USE BY DATE: 01/06/2024

## 2023-12-12 ENCOUNTER — PROCEDURE VISIT (OUTPATIENT)
Dept: PAIN MANAGEMENT | Age: 58
End: 2023-12-12
Payer: MEDICARE

## 2023-12-12 DIAGNOSIS — M51.06 LUMBAR DISC HERNIATION WITH MYELOPATHY: Primary | ICD-10-CM

## 2023-12-12 DIAGNOSIS — M62.830 SPASM OF LUMBAR PARASPINOUS MUSCLE: ICD-10-CM

## 2023-12-12 PROCEDURE — 62370 ANL SP INF PMP W/MDREPRG&FIL: CPT | Performed by: PAIN MEDICINE

## 2023-12-12 NOTE — PROGRESS NOTES
graft scar Seen. Musculoskeletal:         General: Normal range of motion. Cervical back: Normal range of motion and neck supple. Comments: Bilateral SI Joint tender. Skin:     General: Skin is warm. Capillary Refill: Capillary refill takes less than 2 seconds. Neurological:      General: No focal deficit present. Mental Status: He is alert and oriented to person, place, and time. Mental status is at baseline. Psychiatric:         Mood and Affect: Mood normal.         Behavior: Behavior normal.         Thought Content: Thought content normal.         Judgment: Judgment normal.         Plan       Patient is here today for a [employer] IT pump refill. His pump is infusing Morphine & Bupivicaine 15mg/ml each, Baclofen 390mcg/ml concentration and daily basal rate of 0.8mg. Baclofen treats the multi-level spasms he has in his mid and low back. Has PTM and says he is using it with benefit. 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 Nicole Kam is reviewed and consistent. Last UA consistent. Able to do his ADLs. On disability unable to work due to his complex vascular and pain issues. Pain in multi-level spine =8 on pain scale today. Notes some left pain from his buttock to his toes. No adverse or allergic reactions. No signs of fracture, cancer, infection. Pump Refill:. Patent identified with Name and . Pump accessed with , Volume reprogrammed to 20ccs. Skin site for needle placement identified with maynor, Skin prepped with Betadine, Draped, Port accessed with 25G Needle, 8ccs of fluid withdrawn, 20 ccs of Morphine 15mg per cc, Bupivacaine 15mg per cc, Baclofen 290 mics per cc instilled, Refill alarm date is 5/10/24 will plan refill in 3 months.

## 2024-02-23 ENCOUNTER — TELEPHONE (OUTPATIENT)
Dept: PAIN MANAGEMENT | Age: 59
End: 2024-02-23

## 2024-03-13 ENCOUNTER — TELEPHONE (OUTPATIENT)
Dept: PAIN MANAGEMENT | Age: 59
End: 2024-03-13

## 2024-03-13 NOTE — TELEPHONE ENCOUNTER
CREATED AND FAXED PUMP REFILL ORDER TO [facility] ([facility]          ORDER ID: [id number]      EXPECTED DELIVERY DATE:  03/15/2024     REFILL DATE: 3/18/2024    CHANGE FROM PREVIOUS ORDER: NO    HOME CONNECT: NO    MEDICATION ORDERED/CONCENTRATION/UNIT    PF Morphine Sulfate 15 mg/mL  PF Baclofen 290 mCg/mL  PF Bupivacaine HCl 15 mg/mL    TOTAL VOLUME 20 (twenty) mL     [facility] ORDER ATTACHED TO THIS ENCOUNTER.

## 2024-03-15 NOTE — TELEPHONE ENCOUNTER
RECEIVED MEDICATION FOR PUMP REFILL. MEDICATION IS IN THE POST-OP ROOM IN THE LEFT CABINET.     MEDICATION USE BY DATE: 04/12/2024

## 2024-03-18 ENCOUNTER — PROCEDURE VISIT (OUTPATIENT)
Dept: PAIN MANAGEMENT | Age: 59
End: 2024-03-18
Payer: MEDICARE

## 2024-03-18 DIAGNOSIS — M62.830 SPASM OF LUMBAR PARASPINOUS MUSCLE: Primary | ICD-10-CM

## 2024-03-18 DIAGNOSIS — M51.06 LUMBAR DISC HERNIATION WITH MYELOPATHY: ICD-10-CM

## 2024-03-18 PROCEDURE — 62370 ANL SP INF PMP W/MDREPRG&FIL: CPT | Performed by: PAIN MEDICINE

## 2024-03-18 NOTE — PROGRESS NOTES
History of Present Illness     Patient Identification  Jerrell Henry is a 57 y.o. male.    Patient information was obtained from patient.      Chief Complaint   Chief Complaint   Patient presents with    Back Pain     Lower    Leg Pain     Left     Patient is here today for a [employer] IT pump refill. His pump is infusing Morphine & Bupivicaine 15mg/ml each, Baclofen 390mcg/ml concentration and daily basal rate of 0.8mg. Baclofen treats the multi-level spasms he has in his mid and low back. Has PTM and says he is using it with benefit. 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 Oarrs is reviewed and consistent. Last UA consistent. Able to do his ADLs. On disability unable to work due to his complex vascular and pain issues. Pain in multi-level spine =8 on pain scale today. Notes some left pain from his buttock to his toes. No adverse or allergic reactions. No signs of fracture, cancer, infection. Was hospitalized at [facility] since last seen due to small bowel obstruction. Was treated mechanically  and doing well with diet now.      Past Medical History:   Diagnosis Date    Abnormal EKG 4/30/2015    CAD (coronary artery disease) 7/16/2015    Carotid artery disease ([facility]) 6/2/2015    Claudication, class IV ([facility]) 4/30/2015    Dyspnea 4/30/2015    Hyperlipidemia 4/30/2015    Morbid obesity ([facility]) 4/30/2015    Osteoarthritis     PAD (peripheral artery disease) ([facility]) 7/16/2015    Skin ulcer of right foot, limited to breakdown of skin ([facility]) 10/10/2019    TIA (transient ischemic attack) 6/2/2015    Tobacco abuse 4/30/2015    Xanthoma 4/30/2015     Family History   Problem Relation Age of Onset    Diabetes Mother     High Blood Pressure Father      Current Outpatient Medications   Medication Sig Dispense Refill    carvedilol (COREG) 3.125 MG tablet TAKE 1 TABLET BY MOUTH TWICE DAILY 180 tablet 3

## 2024-05-23 ENCOUNTER — HOSPITAL ENCOUNTER (OUTPATIENT)
Dept: ULTRASOUND IMAGING | Age: 59
Discharge: HOME | End: 2024-05-25
Payer: MEDICARE

## 2024-05-23 DIAGNOSIS — R09.89 BILATERAL CAROTID BRUITS: ICD-10-CM

## 2024-05-23 LAB
VAS LEFT CCA DIST EDV: 25.9 CM/S
VAS LEFT CCA DIST PSV: 114 CM/S
VAS LEFT CCA MID EDV: 21.2 CM/S
VAS LEFT CCA MID PSV: 129 CM/S
VAS LEFT CCA PROX EDV: 16.5 CM/S
VAS LEFT CCA PROX PSV: 160 CM/S
VAS LEFT ECA EDV: 15.7 CM/S
VAS LEFT ECA PSV: 129 CM/S
VAS LEFT ICA DIST EDV: 29.4 CM/S
VAS LEFT ICA DIST PSV: 111 CM/S
VAS LEFT ICA MID EDV: 30.8 CM/S
VAS LEFT ICA MID PSV: 103 CM/S
VAS LEFT ICA PROX EDV: 31.5 CM/S
VAS LEFT ICA PROX PSV: 98.8 CM/S
VAS LEFT VERTEBRAL EDV: 27.7 CM/S
VAS LEFT VERTEBRAL PSV: 62 CM/S
VAS RIGHT CCA DIST EDV: 24.9 CM/S
VAS RIGHT CCA DIST PSV: 101 CM/S
VAS RIGHT CCA MID EDV: 18.6 CM/S
VAS RIGHT CCA MID PSV: 122 CM/S
VAS RIGHT CCA PROX EDV: 14.3 CM/S
VAS RIGHT CCA PROX PSV: 111 CM/S
VAS RIGHT ECA EDV: 16.2 CM/S
VAS RIGHT ECA PSV: 124 CM/S
VAS RIGHT ICA DIST EDV: 36.4 CM/S
VAS RIGHT ICA DIST PSV: 149 CM/S
VAS RIGHT ICA MID EDV: 31.7 CM/S
VAS RIGHT ICA MID PSV: 121 CM/S
VAS RIGHT ICA PROX EDV: 19.9 CM/S
VAS RIGHT ICA PROX PSV: 106 CM/S
VAS RIGHT VERTEBRAL EDV: 8.41 CM/S
VAS RIGHT VERTEBRAL PSV: 41.7 CM/S

## 2024-05-23 PROCEDURE — 93880 EXTRACRANIAL BILAT STUDY: CPT

## 2024-06-13 ENCOUNTER — TELEPHONE (OUTPATIENT)
Dept: PAIN MANAGEMENT | Age: 59
End: 2024-06-13

## 2024-06-13 NOTE — TELEPHONE ENCOUNTER
CREATED AND FAXED PUMP REFILL ORDER TO [facility]          ORDER ID: [id number]      EXPECTED DELIVERY DATE:  06/14/2024     REFILL DATE: 6/17/2024    CHANGE FROM PREVIOUS ORDER: NO    HOME CONNECT: NO    MEDICATION ORDERED/CONCENTRATION/UNIT    PF Morphine Sulfate 15 mg/mL  PF Baclofen 290 mCg/mL  PF Bupivacaine HCl 15 mg/mL    TOTAL VOLUME 20 (twenty) mL     [facility] ORDER ATTACHED TO THIS ENCOUNTER.

## 2024-06-14 NOTE — TELEPHONE ENCOUNTER
RECEIVED MEDICATION FOR PUMP REFILL. MEDICATION IS IN THE POST-OP ROOM IN THE LEFT CABINET.     MEDICATION USE BY DATE: 07/13/2024

## 2024-06-17 ENCOUNTER — PROCEDURE VISIT (OUTPATIENT)
Dept: PAIN MANAGEMENT | Age: 59
End: 2024-06-17
Payer: MEDICARE

## 2024-06-17 VITALS — HEIGHT: 68 IN | WEIGHT: 300 LBS | BODY MASS INDEX: 45.47 KG/M2

## 2024-06-17 DIAGNOSIS — M62.830 SPASM OF LUMBAR PARASPINOUS MUSCLE: ICD-10-CM

## 2024-06-17 DIAGNOSIS — M51.06 LUMBAR DISC HERNIATION WITH MYELOPATHY: Primary | ICD-10-CM

## 2024-06-17 PROCEDURE — 62370 ANL SP INF PMP W/MDREPRG&FIL: CPT | Performed by: PAIN MEDICINE

## 2024-06-17 NOTE — PROGRESS NOTES
History of Present Illness     Patient Identification  Jerrell Henry is a 57 y.o. male.    Patient information was obtained from patient.      Chief Complaint   Chief Complaint   Patient presents with    Back Pain     Lower    Leg Pain     Left     Patient is here today for a [employer] IT pump refill. His pump is infusing Morphine & Bupivicaine 15mg/ml each, Baclofen 390mcg/ml concentration and daily basal rate of 0.8mg. Baclofen treats the multi-level spasms he has in his mid and low back. Has PTM and says he is using it with benefit. 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 Oarrs is reviewed and consistent. Last UA consistent. Able to do his ADLs. On disability unable to work due to his complex vascular and pain issues. Pain in multi-level spine =8 on pain scale today. Notes some left pain from his buttock to his toes. No adverse or allergic reactions. No signs of fracture, cancer, infection. Was hospitalized at [place] [facility] since last seen due to small bowel obstruction. Was treated mechanically  and doing well with diet now.      Past Medical History:   Diagnosis Date    Abnormal EKG 4/30/2015    CAD (coronary artery disease) 7/16/2015    Carotid artery disease ([facility]) 6/2/2015    Claudication, class IV ([facility]) 4/30/2015    Dyspnea 4/30/2015    Hyperlipidemia 4/30/2015    Morbid obesity ([facility]) 4/30/2015    Osteoarthritis     PAD (peripheral artery disease) ([facility]) 7/16/2015    Skin ulcer of right foot, limited to breakdown of skin ([facility]) 10/10/2019    TIA (transient ischemic attack) 6/2/2015    Tobacco abuse 4/30/2015    Xanthoma 4/30/2015     Family History   Problem Relation Age of Onset    Diabetes Mother     High Blood Pressure Father      Current Outpatient Medications   Medication Sig Dispense Refill    carvedilol (COREG) 3.125 MG tablet TAKE 1 TABLET BY MOUTH TWICE DAILY 180 tablet 3

## 2024-06-18 ENCOUNTER — OFFICE VISIT (OUTPATIENT)
Dept: CARDIOLOGY CLINIC | Age: 59
End: 2024-06-18
Payer: MEDICARE

## 2024-06-18 VITALS
BODY MASS INDEX: 46.29 KG/M2 | HEART RATE: 85 BPM | DIASTOLIC BLOOD PRESSURE: 80 MMHG | WEIGHT: 305.4 LBS | SYSTOLIC BLOOD PRESSURE: 120 MMHG | HEIGHT: 68 IN | OXYGEN SATURATION: 95 %

## 2024-06-18 DIAGNOSIS — Z72.0 TOBACCO ABUSE: ICD-10-CM

## 2024-06-18 DIAGNOSIS — I73.9 PAD (PERIPHERAL ARTERY DISEASE) (HCC): ICD-10-CM

## 2024-06-18 DIAGNOSIS — I25.10 CORONARY ARTERY DISEASE INVOLVING NATIVE CORONARY ARTERY OF NATIVE HEART WITHOUT ANGINA PECTORIS: Primary | ICD-10-CM

## 2024-06-18 DIAGNOSIS — I65.29 STENOSIS OF CAROTID ARTERY, UNSPECIFIED LATERALITY: ICD-10-CM

## 2024-06-18 DIAGNOSIS — E66.01 MORBID OBESITY (HCC): ICD-10-CM

## 2024-06-18 DIAGNOSIS — E78.2 MIXED HYPERLIPIDEMIA: ICD-10-CM

## 2024-06-18 DIAGNOSIS — E66.01 MORBID OBESITY DUE TO EXCESS CALORIES (HCC): ICD-10-CM

## 2024-06-18 DIAGNOSIS — R94.31 ABNORMAL EKG: ICD-10-CM

## 2024-06-18 PROCEDURE — 99214 OFFICE O/P EST MOD 30 MIN: CPT | Performed by: INTERNAL MEDICINE

## 2024-06-18 PROCEDURE — 93000 ELECTROCARDIOGRAM COMPLETE: CPT | Performed by: INTERNAL MEDICINE

## 2024-06-18 NOTE — PROGRESS NOTES
Chief Complaint   Patient presents with    Coronary Artery Disease    1 Year Follow Up       4-30-15: Patient presents for initial medical evaluation. Patient is followed on a regular basis by no one. Referred by Dr. Ram for abn RALEIGH with right RALEIGH of 0.62, left 0.70 at rest. No exercise was performed. Does have right foot pain and left foot discoloration. No LE ulcers present. Symptoms have been ongoing for the past week or so. No hx of LE procedures. Pain occurs with walking less that 200 feet.  Patient with chronic back pain with hx of pain pump. No hx of MI, CHF or arrhythmias. No hx of stress test or LHC. Denies any CP but does have SOB and worse with minimal exertion. Smokes [id number] yrs.     6-2-15: as above, s/p abnormal nuclear stress test with reversible ischemia in inferolateral wall/Cx territory, EF of 50%. S/p ECHO with EF of 50%, mild LVH, grade I DD.  Compliant with meds. He is SOB with mild exertion, but denies any CP. Continues to have LE discomfort/pain as well as previously stated. Now recalls that he had a TIA a few years back as well. States he had some carotid artery disease at that time but does not know severity.     7-16-15: as above, s/p LHC with 70-80% mid RCA stenosis, s/p FFR=0.82, moderate disease of OM2, normal LVF. S/p LE angiogram with severe left LIEN stenosis s/p stent, 100% right iliac system stenosis s/p unsuccessful antegrade and retrograde  crossing. Left leg is feeling good but continues to have claudication type symptoms in right LE. Compliant with meds. BP is under good control. Cutting down on smoking. S/p carotid US with 16-49% on left ICA and 1-15% on right ICA. Pt denies chest pain, nausea, vomiting, diarrhea, constipation, motor weakness, insomnia, weight loss, syncope, dizziness, lightheadedness, palpitations, PND, orthopnea.     10-15-15: still waiting one having surgery with Dr. Ruvalcaba. Continue to have right LE discomfort/claudication type symptoms. Left leg

## 2024-06-20 ENCOUNTER — TELEPHONE (OUTPATIENT)
Dept: CARDIOLOGY CLINIC | Age: 59
End: 2024-06-20

## 2024-06-20 NOTE — TELEPHONE ENCOUNTER
Attempted to call patient to notify him of Haim's message. No answer, voicemail left.    ----- Message from SHRUTHI Ferro CNP sent at 6/20/2024  8:26 AM EDT -----  Please notify patient that his carotid ultrasound came back normal  Thanks

## 2024-06-22 DIAGNOSIS — I25.10 CORONARY ARTERY DISEASE INVOLVING NATIVE CORONARY ARTERY OF NATIVE HEART WITHOUT ANGINA PECTORIS: ICD-10-CM

## 2024-06-24 RX ORDER — CARVEDILOL 3.12 MG/1
3.12 TABLET ORAL 2 TIMES DAILY
Qty: 180 TABLET | Refills: 3 | Status: SHIPPED | OUTPATIENT
Start: 2024-06-24

## 2024-06-24 NOTE — TELEPHONE ENCOUNTER
Requesting medication refill. Please approve or deny this request.    Rx requested:  Requested Prescriptions     Pending Prescriptions Disp Refills    carvedilol (COREG) 3.125 MG tablet [Pharmacy Med Name: CARVEDILOL 3.125 MG TABLET] 180 tablet 3     Sig: TAKE 1 TABLET BY MOUTH TWICE A DAY         Last Office Visit:   6/18/2024      Next Visit Date:  Future Appointments   Date Time Provider Department Center   9/9/2024  9:00 AM Kalpesh Cantrell MD [facility]   6/24/2025 10:00 AM Young Lopez DO [facility]               Last refill 06/27/2023. Please approve or deny.

## 2024-08-22 ENCOUNTER — TELEPHONE (OUTPATIENT)
Dept: PAIN MANAGEMENT | Age: 59
End: 2024-08-22

## 2024-08-23 DIAGNOSIS — E78.2 MIXED HYPERLIPIDEMIA: ICD-10-CM

## 2024-08-23 RX ORDER — ATORVASTATIN CALCIUM 20 MG/1
TABLET, FILM COATED ORAL
Qty: 90 TABLET | Refills: 3 | Status: SHIPPED | OUTPATIENT
Start: 2024-08-23

## 2024-08-23 NOTE — TELEPHONE ENCOUNTER
Requesting medication refill. Please approve or deny this request.    Rx requested:  Requested Prescriptions     Pending Prescriptions Disp Refills    atorvastatin (LIPITOR) 20 MG tablet [Pharmacy Med Name: ATORVASTATIN 20 MG TABLET] 90 tablet 3     Sig: TAKE 1 TABLET BY MOUTH EVERY DAY         Last Office Visit:   6/18/2024      Next Visit Date:  Future Appointments   Date Time Provider Department Center   9/9/2024  9:00 AM Kalpesh Cantrell MD [facility]   6/24/2025 10:00 AM Young Lopez DO [facility]               Last refill 09/05/2023. Please approve or deny.

## 2024-09-02 DIAGNOSIS — I25.10 CORONARY ARTERY DISEASE INVOLVING NATIVE CORONARY ARTERY OF NATIVE HEART WITHOUT ANGINA PECTORIS: ICD-10-CM

## 2024-09-02 RX ORDER — FUROSEMIDE 40 MG
TABLET ORAL
Qty: 90 TABLET | Refills: 3 | Status: SHIPPED | OUTPATIENT
Start: 2024-09-02

## 2024-09-02 RX ORDER — AMLODIPINE BESYLATE 5 MG/1
TABLET ORAL
Qty: 90 TABLET | Refills: 3 | Status: SHIPPED | OUTPATIENT
Start: 2024-09-02

## 2024-09-02 RX ORDER — CLOPIDOGREL BISULFATE 75 MG/1
TABLET ORAL
Qty: 90 TABLET | Refills: 3 | Status: SHIPPED | OUTPATIENT
Start: 2024-09-02

## 2024-09-03 NOTE — TELEPHONE ENCOUNTER
Requesting medication refill. Please approve or deny this request.    Rx requested:  Requested Prescriptions     Pending Prescriptions Disp Refills    furosemide (LASIX) 40 MG tablet [Pharmacy Med Name: FUROSEMIDE 40 MG TABLET] 90 tablet 3     Sig: TAKE 1 TABLET BY MOUTH EVERY DAY    clopidogrel (PLAVIX) 75 MG tablet [Pharmacy Med Name: CLOPIDOGREL 75 MG TABLET] 90 tablet 3     Sig: TAKE 1 TABLET BY MOUTH EVERY DAY    amLODIPine (NORVASC) 5 MG tablet [Pharmacy Med Name: AMLODIPINE BESYLATE 5 MG TAB] 90 tablet 3     Sig: TAKE 1 TABLET BY MOUTH EVERY DAY         Last Office Visit:   6/18/2024      Next Visit Date:  Future Appointments   Date Time Provider Department Center   9/9/2024  9:00 AM Kalpesh Cantrell MD [facility]   6/24/2025 10:00 AM Young Lopez DO [facility]               Last refill 09/05/2023. Please approve or deny.

## 2024-09-05 ENCOUNTER — TELEPHONE (OUTPATIENT)
Age: 59
End: 2024-09-05

## 2024-09-05 NOTE — TELEPHONE ENCOUNTER
CREATED AND FAXED PUMP REFILL ORDER TO [facility] ([facility]          ORDER ID: [id number]     EXPECTED DELIVERY DATE:  09/06/2024     REFILL DATE: 9/9/2024    CHANGE FROM PREVIOUS ORDER: NO    HOME CONNECT: NO    MEDICATION ORDERED/CONCENTRATION/UNIT    PF Morphine Sulfate 15 mg/mL  PF Baclofen 290 mCg/mL  PF Bupivacaine HCl 15 mg/mL    TOTAL VOLUME 20 (twenty) mL     [facility] ORDER ATTACHED TO THIS ENCOUNTER.

## 2024-09-06 NOTE — TELEPHONE ENCOUNTER
RECEIVED MEDICATION FOR PUMP REFILL. MEDICATION IS IN THE POST-OP ROOM IN THE LEFT CABINET.     MEDICATION USE BY DATE: 10/04/2024

## 2024-09-09 ENCOUNTER — PROCEDURE VISIT (OUTPATIENT)
Age: 59
End: 2024-09-09
Payer: MEDICARE

## 2024-09-09 DIAGNOSIS — M51.06 LUMBAR DISC HERNIATION WITH MYELOPATHY: Primary | ICD-10-CM

## 2024-09-09 DIAGNOSIS — M62.830 SPASM OF LUMBAR PARASPINOUS MUSCLE: ICD-10-CM

## 2024-09-09 PROCEDURE — 99215 OFFICE O/P EST HI 40 MIN: CPT

## 2024-09-09 PROCEDURE — 99213 OFFICE O/P EST LOW 20 MIN: CPT | Performed by: PAIN MEDICINE

## 2024-11-22 ENCOUNTER — TELEPHONE (OUTPATIENT)
Age: 59
End: 2024-11-22

## 2024-11-22 NOTE — TELEPHONE ENCOUNTER
CREATED AND FAXED PUMP REFILL ORDER TO [facility] ([facility]          ORDER ID: [id number]     EXPECTED DELIVERY DATE:  11/27/2024     REFILL DATE: 12/2/2024    CHANGE FROM PREVIOUS ORDER: NO    HOME CONNECT: NO    MEDICATION ORDERED/CONCENTRATION/UNIT    PF Morphine Sulfate 15 mg/mL  PF Baclofen 290 mCg/mL  PF Bupivacaine HCl 15 mg/mL    TOTAL VOLUME 20 (twenty) mL     [facility] ORDER ATTACHED TO THIS ENCOUNTER.

## 2024-11-26 NOTE — TELEPHONE ENCOUNTER
RECEIVED MEDICATION FOR PUMP REFILL. SECURED IN OPEN MEDICATION CABINET.    MEDICATION USE BY DATE: 12/22/2024

## 2024-12-10 ENCOUNTER — PROCEDURE VISIT (OUTPATIENT)
Age: 59
End: 2024-12-10
Payer: MEDICARE

## 2024-12-10 VITALS
HEIGHT: 68 IN | SYSTOLIC BLOOD PRESSURE: 112 MMHG | DIASTOLIC BLOOD PRESSURE: 62 MMHG | WEIGHT: 302 LBS | TEMPERATURE: 97.8 F | BODY MASS INDEX: 45.77 KG/M2

## 2024-12-10 DIAGNOSIS — M51.06 LUMBAR DISC HERNIATION WITH MYELOPATHY: Primary | ICD-10-CM

## 2024-12-10 DIAGNOSIS — M62.830 SPASM OF LUMBAR PARASPINOUS MUSCLE: ICD-10-CM

## 2024-12-10 PROCEDURE — 99215 OFFICE O/P EST HI 40 MIN: CPT

## 2024-12-10 NOTE — PROGRESS NOTES
History of Present Illness     Patient Identification  Jerrell Henry is a 57 y.o. male.    Patient information was obtained from patient.      Chief Complaint   Chief Complaint   Patient presents with    Back Pain     Lower    Leg Pain     Left     Patient is here today for a Acisiontronic IT pump refill. His pump is infusing Morphine & Bupivicaine 15mg/ml each, Baclofen 390mcg/ml concentration and daily basal rate of 0.8mg. Baclofen treats the multi-level spasms he has in his mid and low back. Has PTM and says he is using it with benefit. 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 Oarrs is reviewed and consistent. Last UA consistent. Able to do his ADLs. On disability unable to work due to his complex vascular and pain issues. Pain in multi-level spine =8 on pain scale today. Notes some left pain from his buttock to his toes. No adverse or allergic reactions. No signs of fracture, cancer, infection. Was hospitalized at [facility] since last seen due to small bowel obstruction. Was treated mechanically  and doing well with diet now.      Past Medical History:   Diagnosis Date    Abnormal EKG 4/30/2015    CAD (coronary artery disease) 7/16/2015    Carotid artery disease ([facility]) 6/2/2015    Claudication, class IV ([facility]) 4/30/2015    Dyspnea 4/30/2015    Hyperlipidemia 4/30/2015    Morbid obesity ([facility]) 4/30/2015    Osteoarthritis     PAD (peripheral artery disease) ([facility]) 7/16/2015    Skin ulcer of right foot, limited to breakdown of skin ([facility]) 10/10/2019    TIA (transient ischemic attack) 6/2/2015    Tobacco abuse 4/30/2015    Xanthoma 4/30/2015     Family History   Problem Relation Age of Onset    Diabetes Mother     High Blood Pressure Father      Current Outpatient Medications   Medication Sig Dispense Refill    carvedilol (COREG) 3.125 MG tablet TAKE 1 TABLET BY MOUTH TWICE DAILY 180 tablet 3

## 2025-03-05 ENCOUNTER — TELEPHONE (OUTPATIENT)
Age: 60
End: 2025-03-05

## 2025-03-05 NOTE — TELEPHONE ENCOUNTER
CREATED AND FAXED PUMP REFILL ORDER TO [facility]          ORDER ID: [id number]     EXPECTED DELIVERY DATE:  03/07/2025     REFILL DATE: 3/10/2025    CHANGE FROM PREVIOUS ORDER: NO    HOME CONNECT: NO    MEDICATION ORDERED/CONCENTRATION/UNIT    PF Morphine Sulfate 15 mg/mL  PF Baclofen 290 mCg/mL  PF Bupivacaine HCl 15 mg/mL    TOTAL VOLUME 20 (twenty) mL     [facility] ORDER ATTACHED TO THIS ENCOUNTER.

## 2025-03-10 ENCOUNTER — PROCEDURE VISIT (OUTPATIENT)
Age: 60
End: 2025-03-10
Payer: MEDICARE

## 2025-03-10 VITALS — WEIGHT: 310 LBS | HEIGHT: 67 IN | BODY MASS INDEX: 48.65 KG/M2

## 2025-03-10 DIAGNOSIS — M62.830 SPASM OF LUMBAR PARASPINOUS MUSCLE: ICD-10-CM

## 2025-03-10 DIAGNOSIS — M51.06 LUMBAR DISC HERNIATION WITH MYELOPATHY: ICD-10-CM

## 2025-03-10 PROCEDURE — 99215 OFFICE O/P EST HI 40 MIN: CPT | Performed by: PAIN MEDICINE

## 2025-03-10 PROCEDURE — 62370 ANL SP INF PMP W/MDREPRG&FIL: CPT | Performed by: PAIN MEDICINE

## 2025-03-10 NOTE — PROGRESS NOTES
History of Present Illness     Patient Identification  Jerrell Henry is a 57 y.o. male.    Patient information was obtained from patient.      Chief Complaint   Chief Complaint   Patient presents with    Back Pain     Lower    Leg Pain     Left     Patient is here today for a iPAYsttronic IT pump refill. His pump is infusing Morphine & Bupivicaine 15mg/ml each, Baclofen 390mcg/ml concentration and daily basal rate of 0.8mg. Baclofen treats the multi-level spasms he has in his mid and low back. Has PTM and says he is using it with benefit. 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 Oarrs is reviewed and consistent. Last UA consistent. Able to do his ADLs. On disability unable to work due to his complex vascular and pain issues. Pain in multi-level spine =8 on pain scale today. Notes some left pain from his buttock to his toes. No adverse or allergic reactions. No signs of fracture, cancer, infection. Was hospitalized at [facility] since last seen due to small bowel obstruction. Was treated mechanically  and doing well with diet now.      Past Medical History:   Diagnosis Date    Abnormal EKG 4/30/2015    CAD (coronary artery disease) 7/16/2015    Carotid artery disease ([facility]) 6/2/2015    Claudication, class IV ([facility]) 4/30/2015    Dyspnea 4/30/2015    Hyperlipidemia 4/30/2015    Morbid obesity ([facility]) 4/30/2015    Osteoarthritis     PAD (peripheral artery disease) ([facility]) 7/16/2015    Skin ulcer of right foot, limited to breakdown of skin ([facility]) 10/10/2019    TIA (transient ischemic attack) 6/2/2015    Tobacco abuse 4/30/2015    Xanthoma 4/30/2015     Family History   Problem Relation Age of Onset    Diabetes Mother     High Blood Pressure Father      Current Outpatient Medications   Medication Sig Dispense Refill    carvedilol (COREG) 3.125 MG tablet TAKE 1 TABLET BY MOUTH TWICE DAILY 180 tablet 3

## 2025-06-03 ENCOUNTER — TELEPHONE (OUTPATIENT)
Age: 60
End: 2025-06-03

## 2025-06-03 NOTE — TELEPHONE ENCOUNTER
CREATED AND FAXED PUMP REFILL ORDER TO [facility]          ORDER ID: [id number]     EXPECTED DELIVERY DATE:  06/06/2025     REFILL DATE: 6/9/2025    CHANGE FROM PREVIOUS ORDER: YES    HOME CONNECT: NO    MEDICATION ORDERED/CONCENTRATION/UNIT    PF Morphine Sulfate 10 mg/mL, PF Baclofen 200 mCg/mL, and PF Bupivacaine HCl 10 mg/mL    TOTAL VOLUME 20 (twenty) mL     [facility] ORDER ATTACHED TO THIS ENCOUNTER.

## 2025-06-09 ENCOUNTER — PROCEDURE VISIT (OUTPATIENT)
Age: 60
End: 2025-06-09
Payer: MEDICARE

## 2025-06-09 VITALS
OXYGEN SATURATION: 100 % | DIASTOLIC BLOOD PRESSURE: 80 MMHG | TEMPERATURE: 97 F | SYSTOLIC BLOOD PRESSURE: 130 MMHG | HEART RATE: 104 BPM | BODY MASS INDEX: 48.65 KG/M2 | WEIGHT: 310 LBS | HEIGHT: 67 IN

## 2025-06-09 DIAGNOSIS — M51.06 LUMBAR DISC HERNIATION WITH MYELOPATHY: Primary | ICD-10-CM

## 2025-06-09 DIAGNOSIS — M62.830 SPASM OF LUMBAR PARASPINOUS MUSCLE: ICD-10-CM

## 2025-06-09 PROCEDURE — 62370 ANL SP INF PMP W/MDREPRG&FIL: CPT | Performed by: PAIN MEDICINE

## 2025-06-09 NOTE — PROGRESS NOTES
amLODIPine (NORVASC) 5 MG tablet TAKE 1 TABLET BY MOUTH DAILY 90 tablet 3    atorvastatin (LIPITOR) 20 MG tablet TAKE 1 TABLET BY MOUTH DAILY 90 tablet 3    clopidogrel (PLAVIX) 75 MG tablet TAKE 1 TABLET BY MOUTH DAILY 90 tablet 3    furosemide (LASIX) 40 MG tablet Take 1 tablet by mouth daily 90 tablet 3    aspirin 81 MG tablet Take 81 mg by mouth daily       No current facility-administered medications for this visit.     No Known Allergies    Review of Systems  Review of Systems   Constitutional: Negative.    HENT: Negative.     Eyes: Negative.    Respiratory: Negative.     Cardiovascular: Negative.    Gastrointestinal: Negative.    Endocrine: Negative.    Genitourinary: Negative.    Musculoskeletal: Negative.    Skin: Negative.    Allergic/Immunologic: Negative.    Neurological: Negative.    Hematological: Negative.    Psychiatric/Behavioral: Negative.     All other systems reviewed and are negative.     Physical Exam     /80 (Site: Left Upper Arm, Position: Sitting)   Pulse 93   Temp 97.5 °F (36.4 °C)   Ht 5' 8\" (1.727 m)   Wt (!) 310 lb (140.6 kg)   SpO2 91%   BMI 47.14 kg/m²   Physical Exam  Vitals and nursing note reviewed.   Constitutional:       Appearance: Normal appearance.   HENT:      Head: Normocephalic.      Right Ear: Ear canal normal.      Left Ear: Ear canal normal.      Nose: Nose normal.      Mouth/Throat:      Mouth: Mucous membranes are moist.   Eyes:      Extraocular Movements: Extraocular movements intact.      Conjunctiva/sclera: Conjunctivae normal.      Pupils: Pupils are equal, round, and reactive to light.   Cardiovascular:      Rate and Rhythm: Normal rate and regular rhythm.      Pulses: Normal pulses.      Heart sounds: Normal heart sounds.   Pulmonary:      Effort: Pulmonary effort is normal.      Breath sounds: Normal breath sounds.   Abdominal:      General: Abdomen is flat. Bowel sounds are normal.      Palpations: Abdomen is soft.      Comments: Aorto iliac bypass

## 2025-06-24 ENCOUNTER — OFFICE VISIT (OUTPATIENT)
Dept: CARDIOLOGY CLINIC | Age: 60
End: 2025-06-24
Payer: MEDICARE

## 2025-06-24 VITALS
DIASTOLIC BLOOD PRESSURE: 68 MMHG | WEIGHT: 290 LBS | RESPIRATION RATE: 15 BRPM | OXYGEN SATURATION: 99 % | SYSTOLIC BLOOD PRESSURE: 126 MMHG | BODY MASS INDEX: 45.42 KG/M2 | HEART RATE: 93 BPM

## 2025-06-24 DIAGNOSIS — I65.23 BILATERAL CAROTID ARTERY STENOSIS: ICD-10-CM

## 2025-06-24 DIAGNOSIS — I65.29 STENOSIS OF CAROTID ARTERY, UNSPECIFIED LATERALITY: ICD-10-CM

## 2025-06-24 DIAGNOSIS — I25.10 CORONARY ARTERY DISEASE INVOLVING NATIVE CORONARY ARTERY OF NATIVE HEART WITHOUT ANGINA PECTORIS: Primary | ICD-10-CM

## 2025-06-24 DIAGNOSIS — E78.2 MIXED HYPERLIPIDEMIA: ICD-10-CM

## 2025-06-24 DIAGNOSIS — I73.9 PAD (PERIPHERAL ARTERY DISEASE): ICD-10-CM

## 2025-06-24 DIAGNOSIS — G45.9 TIA (TRANSIENT ISCHEMIC ATTACK): ICD-10-CM

## 2025-06-24 PROCEDURE — 99214 OFFICE O/P EST MOD 30 MIN: CPT | Performed by: INTERNAL MEDICINE

## 2025-06-24 PROCEDURE — 93000 ELECTROCARDIOGRAM COMPLETE: CPT | Performed by: INTERNAL MEDICINE

## 2025-06-24 RX ORDER — MORPHINE SULFATE 10 MG/ML
10 INJECTION, SOLUTION INTRAMUSCULAR; INTRAVENOUS EVERY 4 HOURS PRN
COMMUNITY

## 2025-06-24 RX ORDER — BACLOFEN 2000 UG/ML
50 INJECTION, SOLUTION INTRATHECAL ONCE
COMMUNITY

## 2025-06-24 NOTE — PROGRESS NOTES
Chief Complaint   Patient presents with    Follow-up     1yr follow up    Coronary Artery Disease    Hyperlipidemia       4-30-15: Patient presents for initial medical evaluation. Patient is followed on a regular basis by no one. Referred by Dr. Ram for abn RALEIGH with right RALEIGH of 0.62, left 0.70 at rest. No exercise was performed. Does have right foot pain and left foot discoloration. No LE ulcers present. Symptoms have been ongoing for the past week or so. No hx of LE procedures. Pain occurs with walking less that 200 feet.  Patient with chronic back pain with hx of pain pump. No hx of MI, CHF or arrhythmias. No hx of stress test or LHC. Denies any CP but does have SOB and worse with minimal exertion. Smokes [id number] yrs.     6-2-15: as above, s/p abnormal nuclear stress test with reversible ischemia in inferolateral wall/Cx territory, EF of 50%. S/p ECHO with EF of 50%, mild LVH, grade I DD.  Compliant with meds. He is SOB with mild exertion, but denies any CP. Continues to have LE discomfort/pain as well as previously stated. Now recalls that he had a TIA a few years back as well. States he had some carotid artery disease at that time but does not know severity.     7-16-15: as above, s/p LHC with 70-80% mid RCA stenosis, s/p FFR=0.82, moderate disease of OM2, normal LVF. S/p LE angiogram with severe left LIEN stenosis s/p stent, 100% right iliac system stenosis s/p unsuccessful antegrade and retrograde  crossing. Left leg is feeling good but continues to have claudication type symptoms in right LE. Compliant with meds. BP is under good control. Cutting down on smoking. S/p carotid US with 16-49% on left ICA and 1-15% on right ICA. Pt denies chest pain, nausea, vomiting, diarrhea, constipation, motor weakness, insomnia, weight loss, syncope, dizziness, lightheadedness, palpitations, PND, orthopnea.     10-15-15: still waiting one having surgery with Dr. Ruvalcaba. Continue to have right LE

## 2025-08-25 ENCOUNTER — TELEPHONE (OUTPATIENT)
Age: 60
End: 2025-08-25

## 2025-09-02 ENCOUNTER — PROCEDURE VISIT (OUTPATIENT)
Age: 60
End: 2025-09-02
Payer: MEDICARE

## 2025-09-02 VITALS
BODY MASS INDEX: 45.52 KG/M2 | HEART RATE: 91 BPM | TEMPERATURE: 98.3 F | OXYGEN SATURATION: 97 % | HEIGHT: 67 IN | WEIGHT: 290 LBS

## 2025-09-02 DIAGNOSIS — M62.830 SPASM OF LUMBAR PARASPINOUS MUSCLE: ICD-10-CM

## 2025-09-02 DIAGNOSIS — M51.06 LUMBAR DISC HERNIATION WITH MYELOPATHY: ICD-10-CM

## 2025-09-02 PROCEDURE — 62370 ANL SP INF PMP W/MDREPRG&FIL: CPT | Performed by: PAIN MEDICINE

## 2025-09-05 DIAGNOSIS — E78.2 MIXED HYPERLIPIDEMIA: ICD-10-CM

## 2025-09-05 DIAGNOSIS — I25.10 CORONARY ARTERY DISEASE INVOLVING NATIVE CORONARY ARTERY OF NATIVE HEART WITHOUT ANGINA PECTORIS: ICD-10-CM

## 2025-09-05 RX ORDER — ATORVASTATIN CALCIUM 20 MG/1
20 TABLET, FILM COATED ORAL DAILY
Qty: 90 TABLET | Refills: 3 | Status: SHIPPED | OUTPATIENT
Start: 2025-09-05

## 2025-09-05 RX ORDER — FUROSEMIDE 40 MG/1
40 TABLET ORAL DAILY
Qty: 90 TABLET | Refills: 3 | Status: SHIPPED | OUTPATIENT
Start: 2025-09-05

## 2025-09-05 RX ORDER — CARVEDILOL 3.12 MG/1
3.12 TABLET ORAL 2 TIMES DAILY
Qty: 180 TABLET | Refills: 3 | Status: SHIPPED | OUTPATIENT
Start: 2025-09-05

## 2025-09-05 RX ORDER — AMLODIPINE BESYLATE 5 MG/1
5 TABLET ORAL DAILY
Qty: 90 TABLET | Refills: 3 | Status: SHIPPED | OUTPATIENT
Start: 2025-09-05

## 2025-09-05 RX ORDER — CLOPIDOGREL BISULFATE 75 MG/1
75 TABLET ORAL DAILY
Qty: 90 TABLET | Refills: 3 | Status: SHIPPED | OUTPATIENT
Start: 2025-09-05